# Patient Record
Sex: MALE | Race: WHITE | NOT HISPANIC OR LATINO | URBAN - METROPOLITAN AREA
[De-identification: names, ages, dates, MRNs, and addresses within clinical notes are randomized per-mention and may not be internally consistent; named-entity substitution may affect disease eponyms.]

---

## 2017-07-10 ENCOUNTER — INPATIENT (INPATIENT)
Facility: HOSPITAL | Age: 62
LOS: 2 days | Discharge: HOME | DRG: 603 | End: 2017-07-13
Attending: INTERNAL MEDICINE | Admitting: INTERNAL MEDICINE
Payer: COMMERCIAL

## 2017-07-10 VITALS
SYSTOLIC BLOOD PRESSURE: 133 MMHG | DIASTOLIC BLOOD PRESSURE: 83 MMHG | RESPIRATION RATE: 18 BRPM | OXYGEN SATURATION: 97 % | HEART RATE: 77 BPM | TEMPERATURE: 99 F

## 2017-07-10 DIAGNOSIS — Z98.890 OTHER SPECIFIED POSTPROCEDURAL STATES: Chronic | ICD-10-CM

## 2017-07-10 DIAGNOSIS — I89.1 LYMPHANGITIS: ICD-10-CM

## 2017-07-10 PROBLEM — Z00.00 ENCOUNTER FOR PREVENTIVE HEALTH EXAMINATION: Status: ACTIVE | Noted: 2017-07-10

## 2017-07-10 LAB
ALBUMIN SERPL ELPH-MCNC: 4 G/DL — SIGNIFICANT CHANGE UP (ref 3.3–5)
ALP SERPL-CCNC: 82 U/L — SIGNIFICANT CHANGE UP (ref 40–120)
ALT FLD-CCNC: 16 U/L RC — SIGNIFICANT CHANGE UP (ref 10–45)
ANION GAP SERPL CALC-SCNC: 13 MMOL/L — SIGNIFICANT CHANGE UP (ref 5–17)
APTT BLD: 35.3 SEC — SIGNIFICANT CHANGE UP (ref 27.5–37.4)
AST SERPL-CCNC: 20 U/L — SIGNIFICANT CHANGE UP (ref 10–40)
BILIRUB SERPL-MCNC: 1.1 MG/DL — SIGNIFICANT CHANGE UP (ref 0.2–1.2)
BUN SERPL-MCNC: 14 MG/DL — SIGNIFICANT CHANGE UP (ref 7–23)
CALCIUM SERPL-MCNC: 9.2 MG/DL — SIGNIFICANT CHANGE UP (ref 8.4–10.5)
CHLORIDE SERPL-SCNC: 104 MMOL/L — SIGNIFICANT CHANGE UP (ref 96–108)
CO2 SERPL-SCNC: 24 MMOL/L — SIGNIFICANT CHANGE UP (ref 22–31)
CREAT SERPL-MCNC: 0.98 MG/DL — SIGNIFICANT CHANGE UP (ref 0.5–1.3)
GLUCOSE SERPL-MCNC: 103 MG/DL — HIGH (ref 70–99)
HCT VFR BLD CALC: 44.8 % — SIGNIFICANT CHANGE UP (ref 39–50)
HGB BLD-MCNC: 14.6 G/DL — SIGNIFICANT CHANGE UP (ref 13–17)
INR BLD: 1.59 RATIO — HIGH (ref 0.88–1.16)
MCHC RBC-ENTMCNC: 29.9 PG — SIGNIFICANT CHANGE UP (ref 27–34)
MCHC RBC-ENTMCNC: 32.6 GM/DL — SIGNIFICANT CHANGE UP (ref 32–36)
MCV RBC AUTO: 91.7 FL — SIGNIFICANT CHANGE UP (ref 80–100)
PLATELET # BLD AUTO: 147 K/UL — LOW (ref 150–400)
POTASSIUM SERPL-MCNC: 4 MMOL/L — SIGNIFICANT CHANGE UP (ref 3.5–5.3)
POTASSIUM SERPL-SCNC: 4 MMOL/L — SIGNIFICANT CHANGE UP (ref 3.5–5.3)
PROT SERPL-MCNC: 7.3 G/DL — SIGNIFICANT CHANGE UP (ref 6–8.3)
PROTHROM AB SERPL-ACNC: 17.5 SEC — HIGH (ref 9.8–12.7)
RBC # BLD: 4.89 M/UL — SIGNIFICANT CHANGE UP (ref 4.2–5.8)
RBC # FLD: 13.6 % — SIGNIFICANT CHANGE UP (ref 10.3–14.5)
SODIUM SERPL-SCNC: 141 MMOL/L — SIGNIFICANT CHANGE UP (ref 135–145)
WBC # BLD: 9.9 K/UL — SIGNIFICANT CHANGE UP (ref 3.8–10.5)
WBC # FLD AUTO: 9.9 K/UL — SIGNIFICANT CHANGE UP (ref 3.8–10.5)

## 2017-07-10 PROCEDURE — 93971 EXTREMITY STUDY: CPT | Mod: 26

## 2017-07-10 PROCEDURE — 99222 1ST HOSP IP/OBS MODERATE 55: CPT

## 2017-07-10 PROCEDURE — 99285 EMERGENCY DEPT VISIT HI MDM: CPT

## 2017-07-10 RX ORDER — RIVAROXABAN 15 MG-20MG
20 KIT ORAL EVERY 24 HOURS
Qty: 0 | Refills: 0 | Status: DISCONTINUED | OUTPATIENT
Start: 2017-07-10 | End: 2017-07-13

## 2017-07-10 RX ORDER — CEFAZOLIN SODIUM 1 G
2000 VIAL (EA) INJECTION ONCE
Qty: 0 | Refills: 0 | Status: COMPLETED | OUTPATIENT
Start: 2017-07-10 | End: 2017-07-10

## 2017-07-10 RX ORDER — RIVAROXABAN 15 MG-20MG
0 KIT ORAL
Qty: 0 | Refills: 0 | COMMUNITY

## 2017-07-10 RX ORDER — ATORVASTATIN CALCIUM 80 MG/1
10 TABLET, FILM COATED ORAL AT BEDTIME
Qty: 0 | Refills: 0 | Status: DISCONTINUED | OUTPATIENT
Start: 2017-07-10 | End: 2017-07-11

## 2017-07-10 RX ORDER — ATORVASTATIN CALCIUM 80 MG/1
0 TABLET, FILM COATED ORAL
Qty: 0 | Refills: 0 | COMMUNITY

## 2017-07-10 RX ORDER — UBIDECARENONE 100 MG
0 CAPSULE ORAL
Qty: 0 | Refills: 0 | COMMUNITY

## 2017-07-10 RX ORDER — VANCOMYCIN HCL 1 G
1000 VIAL (EA) INTRAVENOUS ONCE
Qty: 0 | Refills: 0 | Status: COMPLETED | OUTPATIENT
Start: 2017-07-10 | End: 2017-07-10

## 2017-07-10 RX ORDER — CEFAZOLIN SODIUM 1 G
2000 VIAL (EA) INJECTION EVERY 8 HOURS
Qty: 0 | Refills: 0 | Status: DISCONTINUED | OUTPATIENT
Start: 2017-07-10 | End: 2017-07-13

## 2017-07-10 RX ORDER — METOPROLOL TARTRATE 50 MG
0 TABLET ORAL
Qty: 0 | Refills: 0 | COMMUNITY

## 2017-07-10 RX ORDER — METOPROLOL TARTRATE 50 MG
25 TABLET ORAL
Qty: 0 | Refills: 0 | Status: DISCONTINUED | OUTPATIENT
Start: 2017-07-10 | End: 2017-07-13

## 2017-07-10 RX ORDER — CEFAZOLIN SODIUM 1 G
VIAL (EA) INJECTION
Qty: 0 | Refills: 0 | Status: DISCONTINUED | OUTPATIENT
Start: 2017-07-10 | End: 2017-07-13

## 2017-07-10 RX ADMIN — Medication 100 MILLIGRAM(S): at 13:17

## 2017-07-10 RX ADMIN — Medication 100 MILLIGRAM(S): at 21:30

## 2017-07-10 RX ADMIN — Medication 250 MILLIGRAM(S): at 11:47

## 2017-07-10 RX ADMIN — RIVAROXABAN 20 MILLIGRAM(S): KIT at 19:26

## 2017-07-10 NOTE — ED PROVIDER NOTE - MEDICAL DECISION MAKING DETAILS
LLE swelling and erythema after long flight c small amount of L foot trauma; on xarelto for a flutter.  Doubt deep venous thrombosis but will obtain LLE duplex.  Likely lymphangitis/cellulitis.  No proximal swelling or erythema/swalling.  Vanco.  Gentle fluids.  Labs.  Admit.  --BMM

## 2017-07-10 NOTE — ED ADULT NURSE NOTE - OBJECTIVE STATEMENT
1158 Pt with left lower leg redness started today sent in for antibiotics.  No fever chill Pt just returned form [place] form a cruise Pt has 2 inch cut on the bottom of foot.  Pt was walking around bare foot while on Vacation.  IVL placed and bloods sent as ordered and antibiotics hung Pt tolerating Mpuleorn

## 2017-07-10 NOTE — ED PROVIDER NOTE - PHYSICAL EXAMINATION
GEN: Patient well-appearing, well-groomed, resting comfortable, non-toxic, and in NAD.   HEENT: Symmetric Facies, PERRLA, no JVD.   CV: Rate irregularly irregular. No murmurs, gallops, or rubs.   RESP: Lungs CTABL w/ no rhonchi, rales, or wheezing.   ABD: Obese abdomen. Soft, non-tender, non-distended. + Bowel sounds throughout.   EXT: B/L lower extremity swelling, L>R w/ 2+ pitting edema B/L. LLE w/ area of erythema and shiny appearance, 22 cm X 15 cm extending to upper anterior leg and lower posterior leg. Area warm to the touch w/ tenderness to palpation. No calf tenderness. Homans sign - B/L. Palpable DP pulse on R, unable to palpate on L.   Neuro: Grossly intact, AOX3 with normal speech, CN II-XII intact.

## 2017-07-10 NOTE — H&P ADULT - NSHPLABSRESULTS_GEN_ALL_CORE
14.6   9.9   )-----------( 147      ( 10 Jul 2017 11:51 )             44.8       07-10    141  |  104  |  14  ----------------------------<  103<H>  4.0   |  24  |  0.98    Ca    9.2      10 Jul 2017 11:51    TPro  7.3  /  Alb  4.0  /  TBili  1.1  /  DBili  x   /  AST  20  /  ALT  16  /  AlkPhos  82  07-10                  PT/INR - ( 10 Jul 2017 11:51 )   PT: 17.5 sec;   INR: 1.59 ratio         PTT - ( 10 Jul 2017 11:51 )  PTT:35.3 sec    Lactate Trend            CAPILLARY BLOOD GLUCOSE

## 2017-07-10 NOTE — CONSULT NOTE ADULT - SUBJECTIVE AND OBJECTIVE BOX
Patient is a 62y old  Male who presents with a chief complaint of   HPI: 2 days of redness swelling and pain of lle after flying back form [place]  no fever chills  no allergies to ab  no ulcers hs of cellulitis yrs ago.      PAST MEDICAL & SURGICAL HISTORY:  Atrial fibrillation, unspecified type  Stroke  H/O left knee surgery      Social history:    FAMILY HISTORY:    REVIEW OF SYSTEMS  General:	Denies any malaise fatigue or chills. Fevers absent    Skin:No rash  	  Ophthalmologic:Denies any visual complaints,discharge redness or photophobia  	  ENMT:No nasal discharge,headache,sinus congestion or throat pain.No dental complaints    Respiratory and Thorax:No cough,sputum or chest pain.Denies shortness of breath  	  Cardiovascular:	No chest pain,palpitaions or dizziness    Gastrointestinal:	NO nausea,abdominal pain or diarrhea.    Genitourinary:	No dysuria,frequency. No flank pain    Musculoskeletal:	No joint swelling or pain.No weakness    Neurological:No confusion,diziness.No extremity weakness.No bladder or bowel incontinence	    Psychiatric:No delusions or hallucinations	    Hematology/Lymphatics:	No LN swelling.No gum bleeding     Endocrine:	No recent weight gain or loss.No abnormal heat/cold intolerance    Allergic/Immunologic:	No hives or rash   Allergies    No Known Allergies    Intolerances        Antimicrobials:          Vital Signs Last 24 Hrs  T(C): 37.3 (10 Jul 2017 10:52), Max: 37.3 (10 Jul 2017 10:52)  T(F): 99.2 (10 Jul 2017 10:52), Max: 99.2 (10 Jul 2017 10:52)  HR: 77 (10 Jul 2017 10:52) (77 - 77)  BP: 133/83 (10 Jul 2017 10:52) (133/83 - 133/83)  BP(mean): --  RR: 18 (10 Jul 2017 10:52) (18 - 18)  SpO2: 97% (10 Jul 2017 10:52) (97% - 97%)    PHYSICAL EXAM:Pleasant patient in no acute distress.      Constitutional:Comfortable.Awake and alert  obese     Eyes:PERRL EOMI.NO discharge or conjunctival injection    ENMT:No sinus tenderness.No thrush.No pharyngeal exudate or erythema.Fair dental hygiene    Neck:Supple,No LN,no JVD      Respiratory:Good air entry bilaterally,CTA    Cardiovascular:S1 S2 wnl, No murmurs,rub or gallops    Gastrointestinal:Soft BS(+) no tenderness no masses ,No rebound or guarding    Genitourinary:No CVA tendereness     Rectal:    Extremities:No cyanosis,clubbing or edema.    Vascular:peripheral pulses felt    Neurological:AAO X 3,No grossly focal deficits    Skin:No rash     Lymph Nodes:No palpable LNs    Musculoskeletal:No joint swelling or LOM    Psychiatric:Affect normal.                                14.6   9.9   )-----------( 147      ( 10 Jul 2017 11:51 )             44.8         07-10    141  |  104  |  14  ----------------------------<  103<H>  4.0   |  24  |  0.98    Ca    9.2      10 Jul 2017 11:51    TPro  7.3  /  Alb  4.0  /  TBili  1.1  /  DBili  x   /  AST  20  /  ALT  16  /  AlkPhos  82  07-10      RECENT CULTURES:      MICROBIOLOGY:          Radiology:      Assessment:        Recommendations and Plan:    Pager [phone]  After 5 pm/weekends or if no response :[phone]

## 2017-07-10 NOTE — ED PROVIDER NOTE - OBJECTIVE STATEMENT
62 year old M w/ PMH of Atrial fibrillation on Metorolol and Xarelto and CVA early 2017 presents w/ 2 day history of worsening LLE redness, swelling, and pain. Patient states 2 weeks ago he flew to [place] and spent the last 2 weeks fishing, flew back last night. Has chronic dryness of L sole and said he noticed linear cracking on L sole w/ pain but no redness. 2 days ago subsequently developed increasing swelling and redness of L anterior lower leg. Denies fever, chills, shortness of breath. Denies numbness or tingling of L lower leg. Patient states both legs are swollen at baseline but redness and pain are new and worsening. Has had episodes of cellulitis of L leg in the past, last episode ~8 years ago. A friend he was on the trip with gave him 2 doses of Amoxacillin 500 mg yesterday. Saw PCP Dr. Beto Colón yesterday who referred him to ED for evaluation for cellulitis and need for IV antibiotics. 62 year old M w/ PMH of Atrial fibrillation on Metorolol and Xarelto and CVA early 2017 w/ no residual deficits presents w/ 2 day history of worsening LLE redness, swelling, and pain. Patient states 2 weeks ago he flew to [place] and spent the last 2 weeks fishing on a [employer] and swimming in the ocean, flew back last night. Has chronic dryness of L sole and said he noticed linear cracking on L sole w/ pain but no redness while on vacation. 2 days ago subsequently developed increasing swelling and redness of L anterior lower leg. Denies fever, chills, shortness of breath. Denies numbness or tingling of L lower leg. Patient states both legs are swollen at baseline but redness and pain are new and worsening. Has had episodes of cellulitis of L leg in the past, last episode ~8 years ago. A friend he was on the trip with gave him 2 doses of Amoxacillin 500 mg yesterday. Saw PCP Dr. Beot Colón yesterday who referred him to ED for evaluation for cellulitis and need for IV antibiotics.

## 2017-07-10 NOTE — ED PROCEDURE NOTE - PROCEDURE ADDITIONAL DETAILS
POCUS: Emergency Department Focused Ultrasound performed at patient's bedside.  The complete report will be available in PACS.  no evidence of lle dvt. f/u us recommended in 5-7 days

## 2017-07-10 NOTE — ED PROVIDER NOTE - NS ED ROS FT
Constitutional: No fever, chills, night sweats, weight loss.   HEENT: No throat pain, ear pain, nasal pain, epistaxis.   CV: + B/L lower extremit edema. No chest pain, orthopnea, palpitations.   Resp: No SOB, cough, wheezing.   GI: No abdominal pain, nausea, vomiting, diarrhea, constipation.   : No dysuria, hematuria.   MSK: + L lower leg pain and swelling.   Skin: + LLE redness.  Neuro: No numbness, tingling, weakness, paralysis, paresis.

## 2017-07-10 NOTE — H&P ADULT - ASSESSMENT
pt w/ llext cellulitis  iv abs  id eval  check lext doppler  hx a fib  cva  cobt xarelto  cont b blockers / lipitor

## 2017-07-11 LAB
ANION GAP SERPL CALC-SCNC: 16 MMOL/L — SIGNIFICANT CHANGE UP (ref 5–17)
BUN SERPL-MCNC: 15 MG/DL — SIGNIFICANT CHANGE UP (ref 7–23)
CALCIUM SERPL-MCNC: 9.1 MG/DL — SIGNIFICANT CHANGE UP (ref 8.4–10.5)
CHLORIDE SERPL-SCNC: 104 MMOL/L — SIGNIFICANT CHANGE UP (ref 96–108)
CO2 SERPL-SCNC: 23 MMOL/L — SIGNIFICANT CHANGE UP (ref 22–31)
CREAT SERPL-MCNC: 0.9 MG/DL — SIGNIFICANT CHANGE UP (ref 0.5–1.3)
GLUCOSE SERPL-MCNC: 95 MG/DL — SIGNIFICANT CHANGE UP (ref 70–99)
POTASSIUM SERPL-MCNC: 4 MMOL/L — SIGNIFICANT CHANGE UP (ref 3.5–5.3)
POTASSIUM SERPL-SCNC: 4 MMOL/L — SIGNIFICANT CHANGE UP (ref 3.5–5.3)
SODIUM SERPL-SCNC: 143 MMOL/L — SIGNIFICANT CHANGE UP (ref 135–145)
TSH SERPL-MCNC: 0.79 UIU/ML — SIGNIFICANT CHANGE UP (ref 0.27–4.2)

## 2017-07-11 PROCEDURE — 99232 SBSQ HOSP IP/OBS MODERATE 35: CPT

## 2017-07-11 RX ORDER — BACITRACIN ZINC 500 UNIT/G
1 OINTMENT IN PACKET (EA) TOPICAL
Qty: 0 | Refills: 0 | Status: DISCONTINUED | OUTPATIENT
Start: 2017-07-11 | End: 2017-07-13

## 2017-07-11 RX ORDER — KETOCONAZOLE 20 MG/G
1 AEROSOL, FOAM TOPICAL
Qty: 0 | Refills: 0 | Status: DISCONTINUED | OUTPATIENT
Start: 2017-07-11 | End: 2017-07-11

## 2017-07-11 RX ORDER — ATORVASTATIN CALCIUM 80 MG/1
10 TABLET, FILM COATED ORAL DAILY
Qty: 0 | Refills: 0 | Status: DISCONTINUED | OUTPATIENT
Start: 2017-07-11 | End: 2017-07-13

## 2017-07-11 RX ADMIN — Medication 100 MILLIGRAM(S): at 12:36

## 2017-07-11 RX ADMIN — Medication 25 MILLIGRAM(S): at 06:06

## 2017-07-11 RX ADMIN — KETOCONAZOLE 1 APPLICATION(S): 20 AEROSOL, FOAM TOPICAL at 08:31

## 2017-07-11 RX ADMIN — Medication 100 MILLIGRAM(S): at 21:34

## 2017-07-11 RX ADMIN — Medication 1 APPLICATION(S): at 17:11

## 2017-07-11 RX ADMIN — Medication 100 MILLIGRAM(S): at 06:06

## 2017-07-11 RX ADMIN — Medication 1 APPLICATION(S): at 17:10

## 2017-07-11 RX ADMIN — RIVAROXABAN 20 MILLIGRAM(S): KIT at 17:10

## 2017-07-11 RX ADMIN — Medication 25 MILLIGRAM(S): at 17:10

## 2017-07-11 RX ADMIN — ATORVASTATIN CALCIUM 10 MILLIGRAM(S): 80 TABLET, FILM COATED ORAL at 08:25

## 2017-07-11 NOTE — DIETITIAN INITIAL EVALUATION ADULT. - OTHER INFO
Pt reports prior to trying to lose weight over the past 8 months. Pt was weighing ~380 lbs prior to losing weight, pt is weighed twice weekly at nutritionist's office and recently has been ~340 lbs, noted current admit weight 338.4 lbs (7/10). Pt states weight loss has been somewhat limited by inability to engage in much physical activity due to hip pain. Pt with no food allergies, takes Coq10 at home. No issues with N+V, last BM today. No difficulty with chewing/swallowing. Pt did not eat much yesterday and had no breakfast this morning, feeling hungry for lunch.

## 2017-07-11 NOTE — CONSULT NOTE ADULT - ASSESSMENT
63 yo M w/ LLE cellulitis, fissure L foot, and tinea pedis  -Pt seen and evaluated  -no surgical intervention  -rec daily dressing changes with bacitracin to fissure, clotrimazole to surrounding skin  -abx per ID  -seen with attending

## 2017-07-11 NOTE — DIETITIAN INITIAL EVALUATION ADULT. - NS AS NUTRI INTERV MEALS SNACK
1. Continue regular diet as ordered-reviewed healthy alternative menu selections with patient, 2. Review weight management education as able, 3. Monitor PO intake, diet tolerance, weight trends, labs, and skin integrity, 4. RD to remain available as needed

## 2017-07-11 NOTE — PROGRESS NOTE ADULT - ASSESSMENT
pt w/ streptoccocal cellulitis of leg  cont iv abs  antifungal crean  podiatry eval for foot care /w ound  oob  hx a fib/ cva  cont a/c  cont b blockers

## 2017-07-11 NOTE — PROGRESS NOTE ADULT - ASSESSMENT
streptococcal cellultis due to venous disease obesity and fungal infection  continue ancef  add antifungal cream

## 2017-07-11 NOTE — DIETITIAN INITIAL EVALUATION ADULT. - ENERGY NEEDS
Pt seen for consult for BMI >40. Pt with PMH including Afib and stroke now admitted with lower extremitiy swelling found to have cellulitis, on antibiotics.    Ht:5'10" , Wt:338.4 lbs, BMI:48.5 kg/m2, IBW:166 lbs +/- 10%, %IBW: 204%  3+ R ankle/knee edema, Skin free of pressure injury.

## 2017-07-11 NOTE — DIETITIAN INITIAL EVALUATION ADULT. - ORAL INTAKE PTA
good/Pt reports eating well with good appetite consuming 3 meals per day making active efforts to reduce weight. Diet recall: breakfast: coffee with 1/2 and 1/2, wafer bar, Lunch: salad with grilled chicken or meatballs, dinner: salad, veggies, occasionally pasta, protein

## 2017-07-11 NOTE — DIETITIAN INITIAL EVALUATION ADULT. - NS AS NUTRI INTERV ED CONTENT
Provided general healthy eating nutrition therapy, discussed portion sizes using my plate method-pt follows with nutritionist outpatient twice weekly, declined written materials

## 2017-07-11 NOTE — CONSULT NOTE ADULT - SUBJECTIVE AND OBJECTIVE BOX
Patient is a 62y old  Male who presents with a chief complaint of     HPI:  pt with llext swelling and redness   back from trip yesterday (10 Jul 2017 14:33)    States he has a history of a fissure to the left foot with recurrent episodes of cellulitis. Denies FCNV.      PAST MEDICAL & SURGICAL HISTORY:  Atrial fibrillation, unspecified type  Stroke  H/O left knee surgery      MEDICATIONS  (STANDING):  ceFAZolin   IVPB   IV Intermittent   ceFAZolin   IVPB 2000 milliGRAM(s) IV Intermittent every 8 hours  rivaroxaban 20 milliGRAM(s) Oral every 24 hours  metoprolol 25 milliGRAM(s) Oral two times a day  atorvastatin 10 milliGRAM(s) Oral daily  ketoconazole 2% Cream 1 Application(s) Topical two times a day  BACItracin   Ointment 1 Application(s) Topical two times a day    MEDICATIONS  (PRN):      Allergies    No Known Allergies    Intolerances        VITALS:    Vital Signs Last 24 Hrs  T(C): 36.7 (11 Jul 2017 06:03), Max: 37.3 (10 Jul 2017 10:52)  T(F): 98 (11 Jul 2017 06:03), Max: 99.2 (10 Jul 2017 10:52)  HR: 71 (11 Jul 2017 06:03) (59 - 77)  BP: 136/71 (11 Jul 2017 06:03) (123/77 - 137/74)  BP(mean): --  RR: 18 (11 Jul 2017 06:03) (15 - 18)  SpO2: 96% (11 Jul 2017 06:03) (95% - 98%)    LABS:                          14.6   9.9   )-----------( 147      ( 10 Jul 2017 11:51 )             44.8       07-11    143  |  104  |  15  ----------------------------<  95  4.0   |  23  |  0.90    Ca    9.1      11 Jul 2017 08:28    TPro  7.3  /  Alb  4.0  /  TBili  1.1  /  DBili  x   /  AST  20  /  ALT  16  /  AlkPhos  82  07-10      CAPILLARY BLOOD GLUCOSE          PT/INR - ( 10 Jul 2017 11:51 )   PT: 17.5 sec;   INR: 1.59 ratio         PTT - ( 10 Jul 2017 11:51 )  PTT:35.3 sec    LOWER EXTREMITY PHYSICAL EXAM:    Vascular: nonpalpable pulses 2/2 edema, 2+ pitting edema up to lani knees, erythema to left leg from ankle to knee, previously marked with marking pen  Neuro: Epicritic sensation intact  Musculoskeletal/Ortho: no deformities  Skin: fissure to plantar L midfoot, no drainage, does not probe past dermis, surrounding flaky skin consistent with chronic tinea pedis. no surroudning erythema.      RADIOLOGY & ADDITIONAL STUDIES:

## 2017-07-11 NOTE — DIETITIAN INITIAL EVALUATION ADULT. - ADHERENCE
Pt follows up with a nutritionist twice weekly and has been making active efforts to reduce weight, pt sends pictures of all foods consumed to nutritionist , pt trying to consume more veggies and lean meats.

## 2017-07-11 NOTE — PROGRESS NOTE ADULT - SUBJECTIVE AND OBJECTIVE BOX
infectious diseases progress note:    Patient is a 62y old  Male who presents with a chief complaint of       Lymphangitis no change          ROS:  CONSTITUTIONAL:  Negative fever or chills, feels well, good appetite  EYES:  Negative  blurry vision or double vision  CARDIOVASCULAR:  Negative for chest pain or palpitations  RESPIRATORY:  Negative for cough, wheezing, or SOB   GASTROINTESTINAL:  Negative for nausea, vomiting, diarrhea, constipation, or abdominal pain  GENITOURINARY:  Negative frequency, urgency or dysuria  NEUROLOGIC:  No headache, confusion, dizziness, lightheadedness    Allergies    No Known Allergies    Intolerances        ANTIBIOTICS/RELEVANT:  antimicrobials  ceFAZolin   IVPB   IV Intermittent   ceFAZolin   IVPB 2000 milliGRAM(s) IV Intermittent every 8 hours    immunologic:    OTHER:  rivaroxaban 20 milliGRAM(s) Oral every 24 hours  metoprolol 25 milliGRAM(s) Oral two times a day  atorvastatin 10 milliGRAM(s) Oral daily      Objective:  Vital Signs Last 24 Hrs  T(C): 36.7 (11 Jul 2017 06:03), Max: 37.3 (10 Jul 2017 10:52)  T(F): 98 (11 Jul 2017 06:03), Max: 99.2 (10 Jul 2017 10:52)  HR: 71 (11 Jul 2017 06:03) (59 - 77)  BP: 136/71 (11 Jul 2017 06:03) (123/77 - 137/74)  BP(mean): --  RR: 18 (11 Jul 2017 06:03) (15 - 18)  SpO2: 96% (11 Jul 2017 06:03) (95% - 98%)    PHYSICAL EXAM:  Constitutional:Well-developed, well nourished--no acute distress  Eyes:ALLISON, EOMI  Extremities:no change in reddnes no ulcer cut on bottom of foot  due to dermatophyte infection       LABS:                        14.6   9.9   )-----------( 147      ( 10 Jul 2017 11:51 )             44.8     07-10    141  |  104  |  14  ----------------------------<  103<H>  4.0   |  24  |  0.98    Ca    9.2      10 Jul 2017 11:51    TPro  7.3  /  Alb  4.0  /  TBili  1.1  /  DBili  x   /  AST  20  /  ALT  16  /  AlkPhos  82  07-10    PT/INR - ( 10 Jul 2017 11:51 )   PT: 17.5 sec;   INR: 1.59 ratio         PTT - ( 10 Jul 2017 11:51 )  PTT:35.3 sec        MICROBIOLOGY:    RECENT CULTURES:        RESPIRATORY CULTURES:              RADIOLOGY & ADDITIONAL STUDIES:        Pager [phone]  After 5 pm/weekends or if no response :[phone]

## 2017-07-11 NOTE — PROGRESS NOTE ADULT - SUBJECTIVE AND OBJECTIVE BOX
CHIEF COMPLAINT:Patient is a 62y old  Male who presents with a chief complaint of   	llext redness swelling approximately the same        PAST MEDICAL & SURGICAL HISTORY:  Atrial fibrillation, unspecified type  Stroke  H/O left knee surgery          REVIEW OF SYSTEMS:  CONSTITUTIONAL: No fever, weight loss, or fatigue  EYES: No eye pain, visual disturbances, or discharge  NECK: No pain or stiffness  RESPIRATORY: No cough, wheezing, chills or hemoptysis; No Shortness of Breath  CARDIOVASCULAR: No chest pain, palpitations, passing out, dizziness, or leg swelling  GASTROINTESTINAL: No abdominal or epigastric pain. No nausea, vomiting, or hematemesis; No diarrhea or constipation. No melena or hematochezia.  GENITOURINARY: No dysuria, frequency, hematuria, or incontinence  NEUROLOGICAL: No headaches, memory loss, loss of strength, numbness, or tremors  SKIN: llext redness  LYMPH Nodes: No enlarged glands  ENDOCRINE: No heat or cold intolerance; No hair loss  MUSCULOSKELETAL: llext swelling redness/    Medications:  MEDICATIONS  (STANDING):  ceFAZolin   IVPB   IV Intermittent   ceFAZolin   IVPB 2000 milliGRAM(s) IV Intermittent every 8 hours  rivaroxaban 20 milliGRAM(s) Oral every 24 hours  metoprolol 25 milliGRAM(s) Oral two times a day  atorvastatin 10 milliGRAM(s) Oral daily  ketoconazole 2% Cream 1 Application(s) Topical two times a day  BACItracin   Ointment 1 Application(s) Topical two times a day    MEDICATIONS  (PRN):    	    PHYSICAL EXAM:  T(C): 36.7 (07-11-17 @ 06:03), Max: 37.3 (07-10-17 @ 10:52)  HR: 71 (07-11-17 @ 06:03) (59 - 77)  BP: 136/71 (07-11-17 @ 06:03) (123/77 - 137/74)  RR: 18 (07-11-17 @ 06:03) (15 - 18)  SpO2: 96% (07-11-17 @ 06:03) (95% - 98%)  Wt(kg): --  I&O's Summary    10 Jul 2017 07:01  -  11 Jul 2017 07:00  --------------------------------------------------------  IN: 250 mL / OUT: 0 mL / NET: 250 mL        Appearance: Normal	  HEENT:   Normal oral mucosa, PERRL, EOMI	  Lymphatic: No lymphadenopathy  Cardiovascular: Normal S1 S2, No JVD, No murmurs, No edema  Respiratory: Lungs clear to auscultation	  Psychiatry: A & O x 3, Mood & affect appropriate  Gastrointestinal:  Soft, Non-tender, + BS	  Skin: No rashes, No ecchymoses, No cyanosis	  Neurologic: Non-focal  Extremities: Normal range of motion, No clubbing, cyanosis or edema  Vascular: Peripheral pulses palpable 2+ bilaterally    TELEMETRY: 	    ECG:  	  RADIOLOGY:  OTHER: 	  	  LABS:	 	    CARDIAC MARKERS:                                14.6   9.9   )-----------( 147      ( 10 Jul 2017 11:51 )             44.8     07-11    143  |  104  |  15  ----------------------------<  95  4.0   |  23  |  0.90    Ca    9.1      11 Jul 2017 08:28    TPro  7.3  /  Alb  4.0  /  TBili  1.1  /  DBili  x   /  AST  20  /  ALT  16  /  AlkPhos  82  07-10    proBNP:   Lipid Profile:   HgA1c:   TSH:

## 2017-07-11 NOTE — CHART NOTE - NSCHARTNOTEFT_GEN_A_CORE
Upon Nutritional Assessment by the Registered Dietitian your patient was determined to meet criteria / has evidence of the following diagnosis/diagnoses:          [ ]  Mild Protein Calorie Malnutrition        [ ]  Moderate Protein Calorie Malnutrition        [ ] Severe Protein Calorie Malnutrition        [ ] Unspecified Protein Calorie Malnutrition        [ ] Underweight / BMI <19        [ x] Morbid Obesity / BMI > 40      Findings as based on:  [ x] Comprehensive nutrition assessment   [ ] Nutrition Focused Physical Exam  [ ] Other: measurement of height and weight, BMI 48.5      Nutrition Plan/Recommendations:  Provide weight management education, pt reports following with nutritionist twice weekly as outpatient.        PROVIDER Section:     By signing this assessment you are acknowledging and agree with the diagnosis/diagnoses assigned by the Registered Dietitian    Comments:

## 2017-07-12 ENCOUNTER — TRANSCRIPTION ENCOUNTER (OUTPATIENT)
Age: 62
End: 2017-07-12

## 2017-07-12 LAB
ANION GAP SERPL CALC-SCNC: 15 MMOL/L — SIGNIFICANT CHANGE UP (ref 5–17)
BUN SERPL-MCNC: 14 MG/DL — SIGNIFICANT CHANGE UP (ref 7–23)
CALCIUM SERPL-MCNC: 8.8 MG/DL — SIGNIFICANT CHANGE UP (ref 8.4–10.5)
CHLORIDE SERPL-SCNC: 103 MMOL/L — SIGNIFICANT CHANGE UP (ref 96–108)
CO2 SERPL-SCNC: 22 MMOL/L — SIGNIFICANT CHANGE UP (ref 22–31)
CREAT SERPL-MCNC: 0.84 MG/DL — SIGNIFICANT CHANGE UP (ref 0.5–1.3)
GLUCOSE SERPL-MCNC: 102 MG/DL — HIGH (ref 70–99)
HCT VFR BLD CALC: 41.8 % — SIGNIFICANT CHANGE UP (ref 39–50)
HGB BLD-MCNC: 14.2 G/DL — SIGNIFICANT CHANGE UP (ref 13–17)
MCHC RBC-ENTMCNC: 29.9 PG — SIGNIFICANT CHANGE UP (ref 27–34)
MCHC RBC-ENTMCNC: 34 GM/DL — SIGNIFICANT CHANGE UP (ref 32–36)
MCV RBC AUTO: 88 FL — SIGNIFICANT CHANGE UP (ref 80–100)
PLATELET # BLD AUTO: 152 K/UL — SIGNIFICANT CHANGE UP (ref 150–400)
POTASSIUM SERPL-MCNC: 4 MMOL/L — SIGNIFICANT CHANGE UP (ref 3.5–5.3)
POTASSIUM SERPL-SCNC: 4 MMOL/L — SIGNIFICANT CHANGE UP (ref 3.5–5.3)
RBC # BLD: 4.75 M/UL — SIGNIFICANT CHANGE UP (ref 4.2–5.8)
RBC # FLD: 14.3 % — SIGNIFICANT CHANGE UP (ref 10.3–14.5)
SODIUM SERPL-SCNC: 140 MMOL/L — SIGNIFICANT CHANGE UP (ref 135–145)
WBC # BLD: 6.85 K/UL — SIGNIFICANT CHANGE UP (ref 3.8–10.5)
WBC # FLD AUTO: 6.85 K/UL — SIGNIFICANT CHANGE UP (ref 3.8–10.5)

## 2017-07-12 PROCEDURE — 99232 SBSQ HOSP IP/OBS MODERATE 35: CPT

## 2017-07-12 RX ORDER — METOPROLOL TARTRATE 50 MG
1 TABLET ORAL
Qty: 0 | Refills: 0 | COMMUNITY

## 2017-07-12 RX ORDER — METOPROLOL TARTRATE 50 MG
1 TABLET ORAL
Qty: 0 | Refills: 0 | DISCHARGE
Start: 2017-07-12

## 2017-07-12 RX ORDER — BACITRACIN ZINC 500 UNIT/G
1 OINTMENT IN PACKET (EA) TOPICAL
Qty: 1 | Refills: 0
Start: 2017-07-12 | End: 2017-07-17

## 2017-07-12 RX ORDER — ATORVASTATIN CALCIUM 80 MG/1
1 TABLET, FILM COATED ORAL
Qty: 0 | Refills: 0 | COMMUNITY

## 2017-07-12 RX ORDER — RIVAROXABAN 15 MG-20MG
1 KIT ORAL
Qty: 0 | Refills: 0 | COMMUNITY

## 2017-07-12 RX ORDER — CEPHALEXIN 500 MG
1 CAPSULE ORAL
Qty: 28 | Refills: 0
Start: 2017-07-12 | End: 2017-07-19

## 2017-07-12 RX ORDER — RIVAROXABAN 15 MG-20MG
1 KIT ORAL
Qty: 0 | Refills: 0 | DISCHARGE
Start: 2017-07-12

## 2017-07-12 RX ORDER — ATORVASTATIN CALCIUM 80 MG/1
1 TABLET, FILM COATED ORAL
Qty: 0 | Refills: 0 | DISCHARGE
Start: 2017-07-12

## 2017-07-12 RX ADMIN — Medication 25 MILLIGRAM(S): at 17:38

## 2017-07-12 RX ADMIN — ATORVASTATIN CALCIUM 10 MILLIGRAM(S): 80 TABLET, FILM COATED ORAL at 06:07

## 2017-07-12 RX ADMIN — Medication 1 APPLICATION(S): at 17:37

## 2017-07-12 RX ADMIN — Medication 100 MILLIGRAM(S): at 21:27

## 2017-07-12 RX ADMIN — Medication 100 MILLIGRAM(S): at 06:08

## 2017-07-12 RX ADMIN — Medication 1 APPLICATION(S): at 06:07

## 2017-07-12 RX ADMIN — Medication 1 APPLICATION(S): at 06:09

## 2017-07-12 RX ADMIN — Medication 25 MILLIGRAM(S): at 06:09

## 2017-07-12 RX ADMIN — RIVAROXABAN 20 MILLIGRAM(S): KIT at 17:38

## 2017-07-12 RX ADMIN — Medication 100 MILLIGRAM(S): at 12:52

## 2017-07-12 RX ADMIN — Medication 1 APPLICATION(S): at 17:38

## 2017-07-12 NOTE — PROGRESS NOTE ADULT - ASSESSMENT
pt w/ streptoccocal cellulitis of leg  cont iv abs  antifungal crean  podiatry f/u  for foot care /w ound  oob  hx a fib/ cva  cont a/c  cont b blockers  id f/u

## 2017-07-12 NOTE — PROGRESS NOTE ADULT - SUBJECTIVE AND OBJECTIVE BOX
infectious diseases progress note:    Patient is a 62y old  Male who presents with a chief complaint of       Lymphangitis  better .          ROS:  CONSTITUTIONAL:  Negative fever or chills, feels well, good appetite  EYES:  Negative  blurry vision or double vision  CARDIOVASCULAR:  Negative for chest pain or palpitations  RESPIRATORY:  Negative for cough, wheezing, or SOB   GASTROINTESTINAL:  Negative for nausea, vomiting, diarrhea, constipation, or abdominal pain  GENITOURINARY:  Negative frequency, urgency or dysuria  NEUROLOGIC:  No headache, confusion, dizziness, lightheadedness    Allergies    No Known Allergies    Intolerances        ANTIBIOTICS/RELEVANT:  antimicrobials  ceFAZolin   IVPB   IV Intermittent   ceFAZolin   IVPB 2000 milliGRAM(s) IV Intermittent every 8 hours    immunologic:    OTHER:  rivaroxaban 20 milliGRAM(s) Oral every 24 hours  metoprolol 25 milliGRAM(s) Oral two times a day  atorvastatin 10 milliGRAM(s) Oral daily  BACItracin   Ointment 1 Application(s) Topical two times a day  clotrimazole 1% Cream 1 Application(s) Topical two times a day      Objective:  Vital Signs Last 24 Hrs  T(C): 36.4 (12 Jul 2017 04:00), Max: 37.2 (11 Jul 2017 18:30)  T(F): 97.5 (12 Jul 2017 04:00), Max: 99 (11 Jul 2017 18:30)  HR: 64 (12 Jul 2017 05:38) (59 - 72)  BP: 111/74 (12 Jul 2017 05:38) (102/66 - 136/76)  BP(mean): --  RR: 17 (12 Jul 2017 04:00) (17 - 18)  SpO2: 97% (12 Jul 2017 04:00) (94% - 97%)    PHYSICAL EXAM:  Constitutional:Well-developed, well nourished--no acute distress  Eyes:ALLISON, EOMI  Ear/Nose/Throat: no oral lesion, no sinus tenderness on percussion	  Neck:no JVD, no lymphadenopathy, supple  Respiratory: CTA lani  Cardiovascular: S1S2 RRR, no murmurs  Gastrointestinal:soft, (+) BS, no HSM  Extremities: no increased redness  minimal tenderness        LABS:                        14.6   9.9   )-----------( 147      ( 10 Jul 2017 11:51 )             44.8     07-11    143  |  104  |  15  ----------------------------<  95  4.0   |  23  |  0.90    Ca    9.1      11 Jul 2017 08:28    TPro  7.3  /  Alb  4.0  /  TBili  1.1  /  DBili  x   /  AST  20  /  ALT  16  /  AlkPhos  82  07-10    PT/INR - ( 10 Jul 2017 11:51 )   PT: 17.5 sec;   INR: 1.59 ratio         PTT - ( 10 Jul 2017 11:51 )  PTT:35.3 sec        MICROBIOLOGY:    RECENT CULTURES:        RESPIRATORY CULTURES:              RADIOLOGY & ADDITIONAL STUDIES:        Pager [phone]  After 5 pm/weekends or if no response :[phone]

## 2017-07-12 NOTE — DISCHARGE NOTE ADULT - PATIENT PORTAL LINK FT
“You can access the [facility] Patient Portal, offered by [facility], by registering with the following website: [email or web address]”

## 2017-07-12 NOTE — DISCHARGE NOTE ADULT - CARE PLAN
Principal Discharge DX:	Cellulitis  Goal:	continue keflex  Instructions for follow-up, activity and diet:	Take all of your antibiotics as ordered.  Call your Health Care Provider within two days of arriving home to make a follow up appointment within one week.  If the affected cellulitic area increases in redness, warmth, pain or swelling call your Health Care Provider.  If you develop fever, chills, and/or malaise, call your Health Care Provider.  Secondary Diagnosis:	Atrial fibrillation, unspecified type  Goal:	continue xarelto  Instructions for follow-up, activity and diet:	Atrial fibrillation is the most common heart rhythm problem.  The condition puts you at risk for has stroke and heart attack  It helps if you control your blood pressure, not drink more than 1-2 alcohol drinks per day, cut down on caffeine, getting treatment for over active thyroid gland, and get regular exercise  Call your doctor if you feel your heart racing or beating unusually, chest tightness or pain, lightheaded, faint, shortness of breath especially with exercise  It is important to take your heart medication as prescribed  You may be on anticoagulation which is very important to take as directed - you may need blood work to monitor drug levels

## 2017-07-12 NOTE — PROGRESS NOTE ADULT - ASSESSMENT
Despite  redness he is non toxic  feels well  and the redness will take weeks to resolve.  can be discharged in am to complete a total of 10 days of po keflex  500 qid ( including the time given in hospital).  No formal follow up needed with ID unless this relapses.

## 2017-07-12 NOTE — DISCHARGE NOTE ADULT - PLAN OF CARE
continue keflex Take all of your antibiotics as ordered.  Call your Health Care Provider within two days of arriving home to make a follow up appointment within one week.  If the affected cellulitic area increases in redness, warmth, pain or swelling call your Health Care Provider.  If you develop fever, chills, and/or malaise, call your Health Care Provider. Atrial fibrillation is the most common heart rhythm problem.  The condition puts you at risk for has stroke and heart attack  It helps if you control your blood pressure, not drink more than 1-2 alcohol drinks per day, cut down on caffeine, getting treatment for over active thyroid gland, and get regular exercise  Call your doctor if you feel your heart racing or beating unusually, chest tightness or pain, lightheaded, faint, shortness of breath especially with exercise  It is important to take your heart medication as prescribed  You may be on anticoagulation which is very important to take as directed - you may need blood work to monitor drug levels continue xarelto

## 2017-07-12 NOTE — DISCHARGE NOTE ADULT - CARE PROVIDER_API CALL
Beto Colón), Cardiovascular Disease; Critical Care Medicine; Internal Medicine; Nuclear Cardiology  [address] [place] [address]  Phone: [phone]  Fax: [phone]    Ashkan Delong), Infectious Disease; Internal Medicine  [address] [place] [address]  Phone: [phone]  Fax: [phone]

## 2017-07-12 NOTE — DISCHARGE NOTE ADULT - HOSPITAL COURSE
xxx 61 y/o male with pmh significant for afib--on xarelto, cva, left knee surg,  came from a trip yesterday, p/w left leg swelling/redness.    Dx-- LLE cellulitis  pt seen by id   abs as per id   improvement and changed to po w/ outpt f.u

## 2017-07-12 NOTE — DISCHARGE NOTE ADULT - ADDITIONAL INSTRUCTIONS
Follow up with infectious disease if cellulitis relapses.  Follow up with your PCP within 1 week of discharge.

## 2017-07-12 NOTE — PROGRESS NOTE ADULT - SUBJECTIVE AND OBJECTIVE BOX
CHIEF COMPLAINT:Patient is a 62y old  Male who presents with a chief complaint of   	llext redness and swelling        PAST MEDICAL & SURGICAL HISTORY:  Atrial fibrillation, unspecified type  Stroke  H/O left knee surgery          REVIEW OF SYSTEMS:  CONSTITUTIONAL: No fever, weight loss, or fatigue  EYES: No eye pain, visual disturbances, or discharge  NECK: No pain or stiffness  RESPIRATORY: No cough, wheezing, chills or hemoptysis; No Shortness of Breath  CARDIOVASCULAR: No chest pain, palpitations, passing out, dizziness, or leg swelling  GASTROINTESTINAL: No abdominal or epigastric pain. No nausea, vomiting, or hematemesis; No diarrhea or constipation. No melena or hematochezia.  GENITOURINARY: No dysuria, frequency, hematuria, or incontinence  NEUROLOGICAL: No headaches, memory loss, loss of strength, numbness, or tremors  SKIN: No itching, burning, rashes, or lesions   LYMPH Nodes: No enlarged glands  ENDOCRINE: No heat or cold intolerance; No hair loss  MUSCULOSKELETAL:llext swelling redness    Medications:  MEDICATIONS  (STANDING):  ceFAZolin   IVPB   IV Intermittent   ceFAZolin   IVPB 2000 milliGRAM(s) IV Intermittent every 8 hours  rivaroxaban 20 milliGRAM(s) Oral every 24 hours  metoprolol 25 milliGRAM(s) Oral two times a day  atorvastatin 10 milliGRAM(s) Oral daily  BACItracin   Ointment 1 Application(s) Topical two times a day  clotrimazole 1% Cream 1 Application(s) Topical two times a day    MEDICATIONS  (PRN):    	    PHYSICAL EXAM:  T(C): 36.4 (07-12-17 @ 04:00), Max: 37.2 (07-11-17 @ 18:30)  HR: 64 (07-12-17 @ 05:38) (59 - 72)  BP: 111/74 (07-12-17 @ 05:38) (102/66 - 136/76)  RR: 17 (07-12-17 @ 04:00) (17 - 18)  SpO2: 97% (07-12-17 @ 04:00) (94% - 97%)  Wt(kg): --  I&O's Summary      Appearance: Normal	  HEENT:   Normal oral mucosa, PERRL, EOMI	  Lymphatic: No lymphadenopathy  Cardiovascular: Normal S1 S2, No JVD, No murmurs, No edema  Respiratory: Lungs clear to auscultation	  Psychiatry: A & O x 3, Mood & affect appropriate  Gastrointestinal:  Soft, Non-tender, + BS	  Skin: No rashes, No ecchymoses, No cyanosis	  Neurologic: Non-focal from  Extremities: llext slightly better . less swelling  less redness  Vascular: Peripheral pulses palpable 2+ bilaterally    TELEMETRY: 	    ECG:  	  RADIOLOGY:  OTHER: 	  	  LABS:	 	    CARDIAC MARKERS:                                14.6   9.9   )-----------( 147      ( 10 Jul 2017 11:51 )             44.8     07-11    143  |  104  |  15  ----------------------------<  95  4.0   |  23  |  0.90    Ca    9.1      11 Jul 2017 08:28    TPro  7.3  /  Alb  4.0  /  TBili  1.1  /  DBili  x   /  AST  20  /  ALT  16  /  AlkPhos  82  07-10    proBNP:   Lipid Profile:   HgA1c:   TSH:

## 2017-07-12 NOTE — DISCHARGE NOTE ADULT - MEDICATION SUMMARY - MEDICATIONS TO STOP TAKING
I will STOP taking the medications listed below when I get home from the hospital:    CoQ 10 600mg  -- 1 cap(s) by mouth once a day    Aleve 220 mg oral tablet  -- 1 tab(s) by mouth , As Needed

## 2017-07-12 NOTE — DISCHARGE NOTE ADULT - MEDICATION SUMMARY - MEDICATIONS TO TAKE
I will START or STAY ON the medications listed below when I get home from the hospital:    rivaroxaban 20 mg oral tablet  -- 1 tab(s) by mouth every 24 hours  -- Indication: For Atrial fibrillation, unspecified type    atorvastatin 10 mg oral tablet  -- 1 tab(s) by mouth once a day  -- Indication: For Hyperlipidemia    metoprolol tartrate 25 mg oral tablet  -- 1 tab(s) by mouth 2 times a day  -- Indication: For Hypertension    Keflex 500 mg oral capsule  -- 1 cap(s) by mouth 4 times a day  -- Finish all this medication unless otherwise directed by prescriber.    -- Indication: For cellulitis    bacitracin 500 units/g topical ointment  -- 1 application on skin 2 times a day  -- Indication: For rash    clotrimazole 1% topical cream  -- 1 application on skin 2 times a day  -- Indication: For fungal

## 2017-07-13 VITALS
SYSTOLIC BLOOD PRESSURE: 105 MMHG | TEMPERATURE: 98 F | OXYGEN SATURATION: 98 % | RESPIRATION RATE: 18 BRPM | DIASTOLIC BLOOD PRESSURE: 78 MMHG | HEART RATE: 71 BPM

## 2017-07-13 LAB
ANION GAP SERPL CALC-SCNC: 16 MMOL/L — SIGNIFICANT CHANGE UP (ref 5–17)
BUN SERPL-MCNC: 14 MG/DL — SIGNIFICANT CHANGE UP (ref 7–23)
CALCIUM SERPL-MCNC: 9.2 MG/DL — SIGNIFICANT CHANGE UP (ref 8.4–10.5)
CHLORIDE SERPL-SCNC: 101 MMOL/L — SIGNIFICANT CHANGE UP (ref 96–108)
CO2 SERPL-SCNC: 23 MMOL/L — SIGNIFICANT CHANGE UP (ref 22–31)
CREAT SERPL-MCNC: 0.8 MG/DL — SIGNIFICANT CHANGE UP (ref 0.5–1.3)
GLUCOSE SERPL-MCNC: 101 MG/DL — HIGH (ref 70–99)
HCT VFR BLD CALC: 42.8 % — SIGNIFICANT CHANGE UP (ref 39–50)
HGB BLD-MCNC: 14.6 G/DL — SIGNIFICANT CHANGE UP (ref 13–17)
MCHC RBC-ENTMCNC: 29.7 PG — SIGNIFICANT CHANGE UP (ref 27–34)
MCHC RBC-ENTMCNC: 34.1 GM/DL — SIGNIFICANT CHANGE UP (ref 32–36)
MCV RBC AUTO: 87.2 FL — SIGNIFICANT CHANGE UP (ref 80–100)
PLATELET # BLD AUTO: 192 K/UL — SIGNIFICANT CHANGE UP (ref 150–400)
POTASSIUM SERPL-MCNC: 4.1 MMOL/L — SIGNIFICANT CHANGE UP (ref 3.5–5.3)
POTASSIUM SERPL-SCNC: 4.1 MMOL/L — SIGNIFICANT CHANGE UP (ref 3.5–5.3)
RBC # BLD: 4.91 M/UL — SIGNIFICANT CHANGE UP (ref 4.2–5.8)
RBC # FLD: 14.4 % — SIGNIFICANT CHANGE UP (ref 10.3–14.5)
SODIUM SERPL-SCNC: 140 MMOL/L — SIGNIFICANT CHANGE UP (ref 135–145)
WBC # BLD: 6.48 K/UL — SIGNIFICANT CHANGE UP (ref 3.8–10.5)
WBC # FLD AUTO: 6.48 K/UL — SIGNIFICANT CHANGE UP (ref 3.8–10.5)

## 2017-07-13 PROCEDURE — 99285 EMERGENCY DEPT VISIT HI MDM: CPT | Mod: 25

## 2017-07-13 PROCEDURE — 87040 BLOOD CULTURE FOR BACTERIA: CPT

## 2017-07-13 PROCEDURE — 85610 PROTHROMBIN TIME: CPT

## 2017-07-13 PROCEDURE — 85730 THROMBOPLASTIN TIME PARTIAL: CPT

## 2017-07-13 PROCEDURE — 96374 THER/PROPH/DIAG INJ IV PUSH: CPT

## 2017-07-13 PROCEDURE — 84443 ASSAY THYROID STIM HORMONE: CPT

## 2017-07-13 PROCEDURE — 80053 COMPREHEN METABOLIC PANEL: CPT

## 2017-07-13 PROCEDURE — 85027 COMPLETE CBC AUTOMATED: CPT

## 2017-07-13 PROCEDURE — 80048 BASIC METABOLIC PNL TOTAL CA: CPT

## 2017-07-13 PROCEDURE — 93971 EXTREMITY STUDY: CPT

## 2017-07-13 RX ADMIN — Medication 100 MILLIGRAM(S): at 12:02

## 2017-07-13 RX ADMIN — Medication 1 APPLICATION(S): at 05:41

## 2017-07-13 RX ADMIN — ATORVASTATIN CALCIUM 10 MILLIGRAM(S): 80 TABLET, FILM COATED ORAL at 05:41

## 2017-07-13 RX ADMIN — Medication 100 MILLIGRAM(S): at 05:40

## 2017-07-13 RX ADMIN — Medication 25 MILLIGRAM(S): at 06:53

## 2017-07-13 NOTE — PROGRESS NOTE ADULT - ASSESSMENT
pt w/ streptoccocal cellulitis of leg  change to po abs  antifungal crean  podiatry f/u  for foot care /w ound  hx a fib/ cva  cont a/c  cont b blockers  id f/u noted  d/c home today   follow up w/ dr trista umana

## 2017-07-15 LAB
CULTURE RESULTS: SIGNIFICANT CHANGE UP
CULTURE RESULTS: SIGNIFICANT CHANGE UP
SPECIMEN SOURCE: SIGNIFICANT CHANGE UP
SPECIMEN SOURCE: SIGNIFICANT CHANGE UP

## 2019-11-07 ENCOUNTER — EMERGENCY (EMERGENCY)
Facility: HOSPITAL | Age: 64
LOS: 1 days | Discharge: HOME | End: 2019-11-07
Attending: EMERGENCY MEDICINE
Payer: COMMERCIAL

## 2019-11-07 VITALS
DIASTOLIC BLOOD PRESSURE: 83 MMHG | TEMPERATURE: 98 F | SYSTOLIC BLOOD PRESSURE: 127 MMHG | RESPIRATION RATE: 18 BRPM | HEART RATE: 63 BPM | OXYGEN SATURATION: 97 %

## 2019-11-07 VITALS
RESPIRATION RATE: 18 BRPM | WEIGHT: 315 LBS | HEIGHT: 70 IN | TEMPERATURE: 98 F | HEART RATE: 70 BPM | DIASTOLIC BLOOD PRESSURE: 84 MMHG | OXYGEN SATURATION: 98 % | SYSTOLIC BLOOD PRESSURE: 130 MMHG

## 2019-11-07 DIAGNOSIS — Z98.890 OTHER SPECIFIED POSTPROCEDURAL STATES: Chronic | ICD-10-CM

## 2019-11-07 PROBLEM — I48.91 UNSPECIFIED ATRIAL FIBRILLATION: Chronic | Status: ACTIVE | Noted: 2017-07-10

## 2019-11-07 LAB
ALBUMIN SERPL ELPH-MCNC: 4.3 G/DL — SIGNIFICANT CHANGE UP (ref 3.3–5)
ALP SERPL-CCNC: 110 U/L — SIGNIFICANT CHANGE UP (ref 40–120)
ALT FLD-CCNC: 18 U/L — SIGNIFICANT CHANGE UP (ref 10–45)
ANION GAP SERPL CALC-SCNC: 13 MMOL/L — SIGNIFICANT CHANGE UP (ref 5–17)
APPEARANCE UR: ABNORMAL
AST SERPL-CCNC: 17 U/L — SIGNIFICANT CHANGE UP (ref 10–40)
BACTERIA # UR AUTO: 0 — SIGNIFICANT CHANGE UP
BASE EXCESS BLDV CALC-SCNC: 0.1 MMOL/L — SIGNIFICANT CHANGE UP (ref -2–2)
BASE EXCESS BLDV CALC-SCNC: 0.7 MMOL/L — SIGNIFICANT CHANGE UP (ref -2–2)
BASOPHILS # BLD AUTO: 0.03 K/UL — SIGNIFICANT CHANGE UP (ref 0–0.2)
BASOPHILS NFR BLD AUTO: 0.2 % — SIGNIFICANT CHANGE UP (ref 0–2)
BILIRUB SERPL-MCNC: 0.6 MG/DL — SIGNIFICANT CHANGE UP (ref 0.2–1.2)
BILIRUB UR-MCNC: NEGATIVE — SIGNIFICANT CHANGE UP
BUN SERPL-MCNC: 17 MG/DL — SIGNIFICANT CHANGE UP (ref 7–23)
CA-I SERPL-SCNC: 1.13 MMOL/L — SIGNIFICANT CHANGE UP (ref 1.12–1.3)
CA-I SERPL-SCNC: 1.16 MMOL/L — SIGNIFICANT CHANGE UP (ref 1.12–1.3)
CALCIUM SERPL-MCNC: 9.9 MG/DL — SIGNIFICANT CHANGE UP (ref 8.4–10.5)
CHLORIDE BLDV-SCNC: 108 MMOL/L — SIGNIFICANT CHANGE UP (ref 96–108)
CHLORIDE BLDV-SCNC: 110 MMOL/L — HIGH (ref 96–108)
CHLORIDE SERPL-SCNC: 100 MMOL/L — SIGNIFICANT CHANGE UP (ref 96–108)
CO2 BLDV-SCNC: 28 MMOL/L — SIGNIFICANT CHANGE UP (ref 22–30)
CO2 BLDV-SCNC: 28 MMOL/L — SIGNIFICANT CHANGE UP (ref 22–30)
CO2 SERPL-SCNC: 24 MMOL/L — SIGNIFICANT CHANGE UP (ref 22–31)
COLOR SPEC: SIGNIFICANT CHANGE UP
CREAT SERPL-MCNC: 1.19 MG/DL — SIGNIFICANT CHANGE UP (ref 0.5–1.3)
DIFF PNL FLD: ABNORMAL
EOSINOPHIL # BLD AUTO: 0.02 K/UL — SIGNIFICANT CHANGE UP (ref 0–0.5)
EOSINOPHIL NFR BLD AUTO: 0.2 % — SIGNIFICANT CHANGE UP (ref 0–6)
EPI CELLS # UR: 0 /HPF — SIGNIFICANT CHANGE UP
GAS PNL BLDV: 134 MMOL/L — LOW (ref 135–145)
GAS PNL BLDV: 135 MMOL/L — SIGNIFICANT CHANGE UP (ref 135–145)
GAS PNL BLDV: SIGNIFICANT CHANGE UP
GLUCOSE BLDV-MCNC: 106 MG/DL — HIGH (ref 70–99)
GLUCOSE BLDV-MCNC: 95 MG/DL — SIGNIFICANT CHANGE UP (ref 70–99)
GLUCOSE SERPL-MCNC: 113 MG/DL — HIGH (ref 70–99)
GLUCOSE UR QL: NEGATIVE — SIGNIFICANT CHANGE UP
HCO3 BLDV-SCNC: 26 MMOL/L — SIGNIFICANT CHANGE UP (ref 21–29)
HCO3 BLDV-SCNC: 27 MMOL/L — SIGNIFICANT CHANGE UP (ref 21–29)
HCT VFR BLD CALC: 43.6 % — SIGNIFICANT CHANGE UP (ref 39–50)
HCT VFR BLDA CALC: 44 % — SIGNIFICANT CHANGE UP (ref 39–50)
HCT VFR BLDA CALC: 47 % — SIGNIFICANT CHANGE UP (ref 39–50)
HGB BLD CALC-MCNC: 14.3 G/DL — SIGNIFICANT CHANGE UP (ref 13–17)
HGB BLD CALC-MCNC: 15.2 G/DL — SIGNIFICANT CHANGE UP (ref 13–17)
HGB BLD-MCNC: 14.9 G/DL — SIGNIFICANT CHANGE UP (ref 13–17)
HYALINE CASTS # UR AUTO: 0 /LPF — SIGNIFICANT CHANGE UP (ref 0–2)
IMM GRANULOCYTES NFR BLD AUTO: 0.4 % — SIGNIFICANT CHANGE UP (ref 0–1.5)
KETONES UR-MCNC: NEGATIVE — SIGNIFICANT CHANGE UP
LACTATE BLDV-MCNC: 2.8 MMOL/L — HIGH (ref 0.7–2)
LACTATE BLDV-MCNC: 2.8 MMOL/L — HIGH (ref 0.7–2)
LEUKOCYTE ESTERASE UR-ACNC: NEGATIVE — SIGNIFICANT CHANGE UP
LIDOCAIN IGE QN: 28 U/L — SIGNIFICANT CHANGE UP (ref 7–60)
LYMPHOCYTES # BLD AUTO: 1.48 K/UL — SIGNIFICANT CHANGE UP (ref 1–3.3)
LYMPHOCYTES # BLD AUTO: 11.9 % — LOW (ref 13–44)
MCHC RBC-ENTMCNC: 30.2 PG — SIGNIFICANT CHANGE UP (ref 27–34)
MCHC RBC-ENTMCNC: 34.2 GM/DL — SIGNIFICANT CHANGE UP (ref 32–36)
MCV RBC AUTO: 88.4 FL — SIGNIFICANT CHANGE UP (ref 80–100)
MONOCYTES # BLD AUTO: 0.71 K/UL — SIGNIFICANT CHANGE UP (ref 0–0.9)
MONOCYTES NFR BLD AUTO: 5.7 % — SIGNIFICANT CHANGE UP (ref 2–14)
NEUTROPHILS # BLD AUTO: 10.1 K/UL — HIGH (ref 1.8–7.4)
NEUTROPHILS NFR BLD AUTO: 81.6 % — HIGH (ref 43–77)
NITRITE UR-MCNC: NEGATIVE — SIGNIFICANT CHANGE UP
NRBC # BLD: 0 /100 WBCS — SIGNIFICANT CHANGE UP (ref 0–0)
PCO2 BLDV: 49 MMHG — SIGNIFICANT CHANGE UP (ref 35–50)
PCO2 BLDV: 51 MMHG — HIGH (ref 35–50)
PH BLDV: 7.34 — LOW (ref 7.35–7.45)
PH BLDV: 7.34 — LOW (ref 7.35–7.45)
PH UR: 6 — SIGNIFICANT CHANGE UP (ref 5–8)
PLATELET # BLD AUTO: 182 K/UL — SIGNIFICANT CHANGE UP (ref 150–400)
PO2 BLDV: 21 MMHG — LOW (ref 25–45)
PO2 BLDV: 31 MMHG — SIGNIFICANT CHANGE UP (ref 25–45)
POTASSIUM BLDV-SCNC: 3.7 MMOL/L — SIGNIFICANT CHANGE UP (ref 3.5–5.3)
POTASSIUM BLDV-SCNC: 4.1 MMOL/L — SIGNIFICANT CHANGE UP (ref 3.5–5.3)
POTASSIUM SERPL-MCNC: 4.2 MMOL/L — SIGNIFICANT CHANGE UP (ref 3.5–5.3)
POTASSIUM SERPL-SCNC: 4.2 MMOL/L — SIGNIFICANT CHANGE UP (ref 3.5–5.3)
PROT SERPL-MCNC: 7.3 G/DL — SIGNIFICANT CHANGE UP (ref 6–8.3)
PROT UR-MCNC: SIGNIFICANT CHANGE UP
RBC # BLD: 4.93 M/UL — SIGNIFICANT CHANGE UP (ref 4.2–5.8)
RBC # FLD: 13.4 % — SIGNIFICANT CHANGE UP (ref 10.3–14.5)
RBC CASTS # UR COMP ASSIST: 20 /HPF — HIGH (ref 0–4)
SAO2 % BLDV: 28 % — LOW (ref 67–88)
SAO2 % BLDV: 53 % — LOW (ref 67–88)
SODIUM SERPL-SCNC: 137 MMOL/L — SIGNIFICANT CHANGE UP (ref 135–145)
SP GR SPEC: 1.01 — SIGNIFICANT CHANGE UP (ref 1.01–1.02)
UROBILINOGEN FLD QL: NEGATIVE — SIGNIFICANT CHANGE UP
WBC # BLD: 12.39 K/UL — HIGH (ref 3.8–10.5)
WBC # FLD AUTO: 12.39 K/UL — HIGH (ref 3.8–10.5)
WBC UR QL: 1 /HPF — SIGNIFICANT CHANGE UP (ref 0–5)

## 2019-11-07 PROCEDURE — 99284 EMERGENCY DEPT VISIT MOD MDM: CPT | Mod: 25

## 2019-11-07 PROCEDURE — 82947 ASSAY GLUCOSE BLOOD QUANT: CPT

## 2019-11-07 PROCEDURE — 96374 THER/PROPH/DIAG INJ IV PUSH: CPT

## 2019-11-07 PROCEDURE — 82330 ASSAY OF CALCIUM: CPT

## 2019-11-07 PROCEDURE — 87086 URINE CULTURE/COLONY COUNT: CPT

## 2019-11-07 PROCEDURE — 84295 ASSAY OF SERUM SODIUM: CPT

## 2019-11-07 PROCEDURE — 81001 URINALYSIS AUTO W/SCOPE: CPT

## 2019-11-07 PROCEDURE — 83690 ASSAY OF LIPASE: CPT

## 2019-11-07 PROCEDURE — 82435 ASSAY OF BLOOD CHLORIDE: CPT

## 2019-11-07 PROCEDURE — 85027 COMPLETE CBC AUTOMATED: CPT

## 2019-11-07 PROCEDURE — 84132 ASSAY OF SERUM POTASSIUM: CPT

## 2019-11-07 PROCEDURE — 83605 ASSAY OF LACTIC ACID: CPT

## 2019-11-07 PROCEDURE — 74176 CT ABD & PELVIS W/O CONTRAST: CPT | Mod: 26

## 2019-11-07 PROCEDURE — 82803 BLOOD GASES ANY COMBINATION: CPT

## 2019-11-07 PROCEDURE — 99284 EMERGENCY DEPT VISIT MOD MDM: CPT

## 2019-11-07 PROCEDURE — 74176 CT ABD & PELVIS W/O CONTRAST: CPT

## 2019-11-07 PROCEDURE — 85014 HEMATOCRIT: CPT

## 2019-11-07 PROCEDURE — 80053 COMPREHEN METABOLIC PANEL: CPT

## 2019-11-07 PROCEDURE — 96375 TX/PRO/DX INJ NEW DRUG ADDON: CPT

## 2019-11-07 PROCEDURE — 99232 SBSQ HOSP IP/OBS MODERATE 35: CPT

## 2019-11-07 RX ORDER — MORPHINE SULFATE 50 MG/1
4 CAPSULE, EXTENDED RELEASE ORAL ONCE
Refills: 0 | Status: DISCONTINUED | OUTPATIENT
Start: 2019-11-07 | End: 2019-11-07

## 2019-11-07 RX ORDER — OXYCODONE AND ACETAMINOPHEN 5; 325 MG/1; MG/1
1 TABLET ORAL
Qty: 2 | Refills: 0
Start: 2019-11-07 | End: 2019-11-07

## 2019-11-07 RX ORDER — SODIUM CHLORIDE 9 MG/ML
1000 INJECTION INTRAMUSCULAR; INTRAVENOUS; SUBCUTANEOUS ONCE
Refills: 0 | Status: COMPLETED | OUTPATIENT
Start: 2019-11-07 | End: 2019-11-07

## 2019-11-07 RX ORDER — KETOROLAC TROMETHAMINE 30 MG/ML
30 SYRINGE (ML) INJECTION ONCE
Refills: 0 | Status: DISCONTINUED | OUTPATIENT
Start: 2019-11-07 | End: 2019-11-07

## 2019-11-07 RX ORDER — TAMSULOSIN HYDROCHLORIDE 0.4 MG/1
1 CAPSULE ORAL
Qty: 2 | Refills: 0
Start: 2019-11-07 | End: 2019-11-08

## 2019-11-07 RX ADMIN — Medication 30 MILLIGRAM(S): at 15:28

## 2019-11-07 RX ADMIN — MORPHINE SULFATE 4 MILLIGRAM(S): 50 CAPSULE, EXTENDED RELEASE ORAL at 16:31

## 2019-11-07 RX ADMIN — MORPHINE SULFATE 4 MILLIGRAM(S): 50 CAPSULE, EXTENDED RELEASE ORAL at 15:28

## 2019-11-07 RX ADMIN — Medication 30 MILLIGRAM(S): at 16:31

## 2019-11-07 RX ADMIN — SODIUM CHLORIDE 1000 MILLILITER(S): 9 INJECTION INTRAMUSCULAR; INTRAVENOUS; SUBCUTANEOUS at 16:14

## 2019-11-07 RX ADMIN — SODIUM CHLORIDE 1000 MILLILITER(S): 9 INJECTION INTRAMUSCULAR; INTRAVENOUS; SUBCUTANEOUS at 15:28

## 2019-11-07 NOTE — ED PROVIDER NOTE - NSFOLLOWUPINSTRUCTIONS_ED_ALL_ED_FT
You have been seen and evaluated in the hospital for flank pain which was found to be due to a kidney stone. This diagnosis was made on CT. You were also seen and evaluated by urology who stated you were medically clear for discharge with strict follow up w/ Dr. Chapman. Please make sure to return to the ED for the following symptoms which include but are not limited to unrelenting abdominal pain, anay blood in urine, fever, chills, nausea, vomiting, or any change from baseline that is concerning.

## 2019-11-07 NOTE — CONSULT NOTE ADULT - ASSESSMENT
64 year old male with PMHx of a.fib on xarelto and HLD presenting to ED with left renal colic secondary to a 3mm uvj stone     -Fluid hydration  -PO analgesia and antiemetics prn  -Flomax 0.4mg   -Strain urine  -Call the office or go to the emergency room if you experience fever greater than 101, chills, pain not relieved by oral medication, inability to tolerate food or liquids.  -Follow up with Dr. Chapman tomorrow at 11: 45am   [phone]     D/w Dr. Chapman

## 2019-11-07 NOTE — CONSULT NOTE ADULT - SUBJECTIVE AND OBJECTIVE BOX
HPI: 64 year old male with PMHx of a.fib on xarelto and HLD presents to the ED complaining of left sided flank plain since 9am this morning. Pain is described as sharp and now radiating to LLQ. Admits to associated nausea, and urinary frequency. He denies fever, chills, vomiting, urgency, dysuria or gross hematuria.    PAST MEDICAL & SURGICAL HISTORY:  Atrial fibrillation, unspecified type  Stroke  H/O left knee surgery    FAMILY HISTORY:  No pertinent family history in first degree relatives    SOCIAL HISTORY:   Tobacco hx:  MEDICATIONS  (STANDING):    MEDICATIONS  (PRN):    Allergies    No Known Allergies    Intolerances    REVIEW OF SYSTEMS: Pertinent positives and negatives as stated in HPI, otherwise negative    Vital signs  T(C): 36.9 (19 @ 18:43), Max: 37 (19 @ 15:56)  HR: 63 (19 @ 18:43)  BP: 127/83 (19 @ 18:43)  SpO2: 97% (19 @ 18:43)  Wt(kg): --    Output      Physical Exam  Gen: NAD  Pulm: No respiratory distress, no subcostal retractions  CV: RRR, no JVD  Abd: Soft, NT, ND  : Uncircumcised/Circumcised, no lesions.  No discharge or blood at urethral meatus.  Testes descended bilaterally.  Testes and epididymis nontender bilaterally.  Cremasteric reflex present bilaterally.  MSK: No edema present    LABS:     @ 15:33    WBC 12.39 / Hct 43.6  / SCr 1.19         137  |  100  |  17  ----------------------------<  113<H>  4.2   |  24  |  1.19    Ca    9.9      2019 15:33    TPro  7.3  /  Alb  4.3  /  TBili  0.6  /  DBili  x   /  AST  17  /  ALT  18  /  AlkPhos  110        Urinalysis Basic - ( 2019 15:37 )    Color: Light Yellow / Appearance: Slightly Turbid / S.012 / pH: x  Gluc: x / Ketone: Negative  / Bili: Negative / Urobili: Negative   Blood: x / Protein: Trace / Nitrite: Negative   Leuk Esterase: Negative / RBC: 20 /hpf / WBC 1 /HPF   Sq Epi: x / Non Sq Epi: 0 /hpf / Bacteria: 0.0        Urine Cx:   Blood Cx:    Radiology: HPI: 64 year old male with PMHx of a.fib on xarelto and HLD presents to the ED complaining of left sided flank plain since 9am this morning. Pain is described as sharp and now radiating to LLQ. Admits to associated nausea, and urinary frequency. He denies fever, chills, vomiting, urgency, dysuria or gross hematuria.    Currently pain controlled in ED with morphine.     PAST MEDICAL & SURGICAL HISTORY:  Atrial fibrillation, unspecified type  Stroke  H/O left knee surgery    FAMILY HISTORY:  No pertinent family history in first degree relatives    SOCIAL HISTORY:   Tobacco hx:  MEDICATIONS  (STANDING):    MEDICATIONS  (PRN):    Allergies    No Known Allergies    Intolerances    REVIEW OF SYSTEMS: Pertinent positives and negatives as stated in HPI, otherwise negative    Vital signs  T(C): 36.9 (19 @ 18:43), Max: 37 (19 @ 15:56)  HR: 63 (19 @ 18:43)  BP: 127/83 (19 @ 18:43)  SpO2: 97% (19 @ 18:43)    Physical Exam  Gen: NAD  Pulm: No respiratory distress, no subcostal retractions  CV: RRR, no JVD  Abd: Soft, NT, ND  Back: No cvat     LABS:     @ 15:33    WBC 12.39 / Hct 43.6  / SCr 1.19         137  |  100  |  17  ----------------------------<  113<H>  4.2   |  24  |  1.19    Ca    9.9      2019 15:33    TPro  7.3  /  Alb  4.3  /  TBili  0.6  /  DBili  x   /  AST  17  /  ALT  18  /  AlkPhos  110        Urinalysis Basic - ( 2019 15:37 )    Color: Light Yellow / Appearance: Slightly Turbid / S.012 / pH: x  Gluc: x / Ketone: Negative  / Bili: Negative / Urobili: Negative   Blood: x / Protein: Trace / Nitrite: Negative   Leuk Esterase: Negative / RBC: 20 /hpf / WBC 1 /HPF   Sq Epi: x / Non Sq Epi: 0 /hpf / Bacteria: 0.0    Urine Cx: sent      Radiology:  < from: CT Abdomen and Pelvis No Cont (19 @ 17:22) >  IMPRESSION:     3 mm obstructing calculus at the left ureterovesicular junction with mild   left hydronephrosis.    < end of copied text >

## 2019-11-07 NOTE — ED PROVIDER NOTE - PATIENT PORTAL LINK FT
You can access the FollowMyHealth Patient Portal offered by [facility] by registering at the following website: [email or web address]. By joining [employer]’s FollowMyHealth portal, you will also be able to view your health information using other applications (apps) compatible with our system.

## 2019-11-07 NOTE — ED ADULT NURSE NOTE - NSIMPLEMENTINTERV_GEN_ALL_ED
Implemented All Universal Safety Interventions:  [place] to call system. Call bell, personal items and telephone within reach. Instruct patient to call for assistance. Room bathroom lighting operational. Non-slip footwear when patient is off stretcher. Physically safe environment: no spills, clutter or unnecessary equipment. Stretcher in lowest position, wheels locked, appropriate side rails in place.

## 2019-11-07 NOTE — ED PROVIDER NOTE - OBJECTIVE STATEMENT
64 year old male with past medical history of afib and hld presenting to the ED complaining of left sided flank plain. The patient stated that the pain started this morning around 9am and has been constant all day. He describes the pain as a sharp 7/10 pain localized to his left flank without radiation. He admits to some increased urinary frequency today and yesterday, and the pain is slightly relieved when he urinates. He was seen today by his urologist who referred him to the ED to get a workup for a stone. He denies fever, vomiting, abdominal pain, history of stones. 64 year old male  morbidly obese with past medical history of afib and hld on blockers and Xarelto.    presenting to the ED complaining of left sided flank plain. The patient stated that the pain started this morning around 9am and has been constant all day. He describes the pain as a sharp 7/10 pain localized to his left flank without radiation. He admits to some increased urinary frequency today and yesterday, and the pain is slightly relieved when he urinates. He was seen today by his urologist who referred him to the ED to get a workup for a stone. He denies fever, vomiting, abdominal pain, history of stones.   dr Chapman [phone]

## 2019-11-07 NOTE — ED ADULT NURSE NOTE - OBJECTIVE STATEMENT
pt with left flank pain onset this morning with urinary urgency pt afib obese hx ambulatory to er denies vomiting

## 2019-11-07 NOTE — ED PROVIDER NOTE - CLINICAL SUMMARY MEDICAL DECISION MAKING FREE TEXT BOX
63 year old morbidly obese male with sudden onset of left flank pain, urinary frequency and urgency since this morning. No nausea, vomiting, blood in urine. Most likely kidney stone, will obtain blood work, ct stone hunt, pain medication, and re evaluation.ZR

## 2019-11-07 NOTE — ED PROVIDER NOTE - PROGRESS NOTE DETAILS
Hansel PGY1- patient pain controlled. Urology evaluated stable for d/c w/ flomax and med for pain as well as follow outpt uro Dr. Chapman

## 2019-11-08 LAB
CULTURE RESULTS: NO GROWTH — SIGNIFICANT CHANGE UP
SPECIMEN SOURCE: SIGNIFICANT CHANGE UP

## 2020-10-14 NOTE — ED ADULT NURSE NOTE - CAS DISCH CONDITION
Anesthesia Pre Eval Note    Anesthesia ROS/Med Hx    Overall Review:  EKG was reviewed     Anesthetic Complication History:  Patient does not have a history of anesthetic complications      Pulmonary Review:  Patient does not have a pulmonary history      Neuro/Psych Review:  Positive for seizures   Positive for psychiatric history    Cardiovascular Review:  Exercise tolerance: good (>4 METS)  Positive for hyperlipidemia    GI/HEPATIC/RENAL Review:  Patient does not have a GI/hepatic/renalhistory       End/Other Review:    Positive for anemia  Additional Results:  EKG:  No results found for this or any previous visit (from the past 4464 hour(s)).    ALLERGIES:  No Known Allergies       Lab Results       Component                Value               Date                       WBC                      4.5                 06/05/2020                 WBC                      5.3                 03/22/2019                 RBC                      4.29 (L)            06/05/2020                 RBC                      4.36 (L)            03/22/2019                 HCT                      39.1                06/05/2020                 HCT                      40.3                03/22/2019                 MCHC                     33.5                06/05/2020                 MCHC                     33.3                03/22/2019                 SODIUM                   142                 06/05/2020                 SODIUM                   141                 03/22/2019                 POTASSIUM                4.8                 06/05/2020                 POTASSIUM                4.1                 03/22/2019                 CHLORIDE                 104                 06/05/2020                 CHLORIDE                 102                 03/22/2019                 CO2                      27                  06/05/2020                 CO2                      28                  03/22/2019                 CO2                       32                  10/17/2011                 GLUCOSE                  100 (H)             06/05/2020                 GLUCOSE                  95                  03/22/2019                 BUN                      17                  06/05/2020                 BUN                      15                  03/22/2019                 CALCIUM                  9.0                 06/05/2020                 CALCIUM                  8.8                 03/22/2019                 CALCIUM                  9.3                 10/17/2011                 PLT                      236                 06/05/2020                 PLT                      228                 03/22/2019                 PLT                      224                 05/16/2013                 PLT                      209                 07/11/2012               Past Medical History:  No date: ANEMIA  No date: DEPRESSION  No date: HYPERLIPIDEMIA  No date: Loss of smell  No date: Peripheral vision loss  2/94: PNEUMONIA      Comment:  Necrotizing pneumonia of LLL  1991: SEIZURE DISORDER      Comment:  Post traumatic  1982: SKULL FRACTURE      Comment:  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 Hill  06/22/11: Esophagogastroduodenoscopy transoral flex w/bx single or   mult      Comment:  GERD,normal path,Dr. Hill  6/25/14: Finger tendon repair      Comment:  Left 4th finger  No date: Head surgery proc unlisted      Comment:  repair skull fracture  No date: Knee scope,diagnostic      Comment:  Right  age 18: Removal of  tonsils,11+ y/o  age 18: Repair ing hernia,5+y/o,reducibl      Comment:  Bilateral       Prior to Admission medications :  Medication sertraline (ZOLOFT) 100 MG tablet, Sig Take 1 tablet by mouth daily., Start Date 9/2/20, End Date , Taking? Yes, Authorizing Provider Cori Sue MD    Medication oxybutynin (DITROPAN-XL) 10 MG 24 hr tablet, Sig Take 1 tablet by mouth daily., Start Date 9/2/20, End Date , Taking? Yes, Authorizing Provider Zhou Alvarez MD    Medication phenyTOIN (DILANTIN) 100 MG ER capsule, Sig Take 3 capsules by mouth daily., Start Date 7/27/20, End Date , Taking? Yes, Authorizing Provider Cori Sue MD    Medication atorvastatin (LIPITOR) 20 MG tablet, Sig Take 1 tablet by mouth daily., Start Date 7/8/20, End Date , Taking? Yes, Authorizing Provider Cori Sue MD    Medication tadalafil (CIALIS) 20 MG tablet, Sig Take 1 tablet by mouth as needed for Erectile Dysfunction., Start Date 1/10/20, End Date , Taking? Yes, Authorizing Provider Zhou Alvarez MD    Medication Multiple Vitamin capsule, Sig Take 1 capsule by mouth daily., Start Date 5/10/12, End Date , Taking? Yes, Authorizing Provider Cori Sue MD    Medication Cholecalciferol (VITAMIN D) 2000 UNIT tablet, Sig Take 2,000 Units by mouth daily., Start Date 5/17/11, End Date , Taking? Yes, Authorizing Provider Cori Sue MD    Medication zoster vaccine recomb adjuvanted (Shingrix) 50 MCG/0.5ML injection, Sig Inject 0.5 mLs into the muscle. Repeat dose in 2 to 6 months (unless 1 dose already given), for a total of 2 doses., Start Date 8/26/20, End Date , Taking? , Authorizing Provider Titus Taylor MD            Relevant Problems   No relevant active problems       Physical Exam     Airway   Mallampati: III  TM Distance: <3 FB  Neck ROM: Full    Cardiovascular  Cardiovascular exam normal    Dental Exam  Dental exam normal    Pulmonary Exam  Pulmonary exam normal    Abdominal  Exam  Abdominal exam normal      Anesthesia Plan  No phone call attempted due to:  ASA Status: 2    Anesthesia Type: MAC    Maintenance: TIVA  Premedication: None      Post-op Pain Management: Per Surgeon      Checklist  Reviewed: Lab Results, Consultations, DNR Status, Past Med History, NPO Status, Patient Summary, Allergies, Medications, Problem list and Nursing Notes    Informed Consent  The proposed anesthetic plan, including its risks and benefits, have been discussed with the Patient  - along with the risks and benefits of alternatives.  Questions were encouraged and answered and the patient and/or representative understands and agrees to proceed.     Blood Products: Not Anticipated    Comments  Plan Comments: Discussed MAC. GA for backup discussed. Discussed risk of sore throat, dental and oropharyngeal trauma, allergic reaction, nerve injury, pulmonary complications, cardiac arrest and even death.     Stable

## 2021-11-29 ENCOUNTER — EMERGENCY (EMERGENCY)
Facility: HOSPITAL | Age: 66
LOS: 1 days | Discharge: HOME | End: 2021-11-29
Attending: EMERGENCY MEDICINE | Admitting: EMERGENCY MEDICINE
Payer: COMMERCIAL

## 2021-11-29 VITALS
DIASTOLIC BLOOD PRESSURE: 77 MMHG | HEART RATE: 56 BPM | SYSTOLIC BLOOD PRESSURE: 150 MMHG | OXYGEN SATURATION: 98 % | RESPIRATION RATE: 16 BRPM | TEMPERATURE: 97 F | HEIGHT: 70 IN | WEIGHT: 268.08 LBS

## 2021-11-29 DIAGNOSIS — Z98.890 OTHER SPECIFIED POSTPROCEDURAL STATES: Chronic | ICD-10-CM

## 2021-11-29 PROCEDURE — 99284 EMERGENCY DEPT VISIT MOD MDM: CPT | Mod: 25

## 2021-11-29 PROCEDURE — 70450 CT HEAD/BRAIN W/O DYE: CPT | Mod: MA

## 2021-11-29 PROCEDURE — 70450 CT HEAD/BRAIN W/O DYE: CPT | Mod: 26,MA

## 2021-11-29 PROCEDURE — 99284 EMERGENCY DEPT VISIT MOD MDM: CPT

## 2021-11-29 NOTE — ED PROVIDER NOTE - PATIENT PORTAL LINK FT
You can access the FollowMyHealth Patient Portal offered by [facility] by registering at the following website: [email or web address]. By joining [employer]’s FollowMyHealth portal, you will also be able to view your health information using other applications (apps) compatible with our system.

## 2021-11-29 NOTE — ED ADULT NURSE NOTE - OBJECTIVE STATEMENT
Pt arrived to ED s/p slip and fall on wet floor early this morning around 4am. pt state he hit right temporal head. CT complete.

## 2021-11-29 NOTE — ED PROVIDER NOTE - ATTENDING CONTRIBUTION TO CARE
Dr. Horton: I performed a face to face bedside interview with patient regarding history of present illness, review of symptoms and past medical history. I completed an independent physical exam.  I have discussed patient's plan of care with PA.   I agree with note as stated above, having amended the EMR as needed to reflect my findings.   This includes HISTORY OF PRESENT ILLNESS, HIV, PAST MEDICAL/SURGICAL/FAMILY/SOCIAL HISTORY, ALLERGIES AND HOME MEDICATIONS, REVIEW OF SYSTEMS, PHYSICAL EXAM, and any PROGRESS NOTES during the time I functioned as the attending physician for this patient.    see mdm

## 2021-11-29 NOTE — ED PROVIDER NOTE - CLINICAL SUMMARY MEDICAL DECISION MAKING FREE TEXT BOX
Dr. Horton: 66M h/o TIA, Afib on Xarelto p/w mechanical slip and fall last night, hit left side of head on door, no LOC. No nausea or vomiting, no other trauma, no headache, but sent by pmd for head CT. On exam pt is very well appearing, nad, irreg irreg, ctab, abdo soft/nt/nd, no signs of head trauma. Declined meds. Will get CT head.

## 2021-11-29 NOTE — ED PROVIDER NOTE - NSFOLLOWUPINSTRUCTIONS_ED_ALL_ED_FT
Follow up with your PMD within 24-48 hrs   Rest, Take Tylenol 650mg every 4-6 hours as needed for pain.  Take all of your other medications as previously described.   Return to the emergency department with ANY worsening or new concerning symptoms.     Fall Prevention    WHAT YOU NEED TO KNOW:    Fall prevention includes ways to make your home and other areas safer. It also includes ways you can move more carefully to prevent a fall. Health conditions that cause changes in your blood pressure, vision, or muscle strength and coordination may increase your risk for falls. Medicines may also increase your risk for falls if they make you dizzy, weak, or sleepy.    DISCHARGE INSTRUCTIONS:    Call 911 or have someone else call if:   •You have fallen and are unconscious.      •You have fallen and cannot move part of your body.      Contact your healthcare provider if:   •You have fallen and have pain or a headache.      •You have questions or concerns about your condition or care.      Fall prevention tips:   •Stand or sit up slowly. This may help you keep your balance and prevent falls.      •Use assistive devices as directed. Your healthcare provider may suggest that you use a cane or walker to help you keep your balance. You may need to have grab bars put in your bathroom near the toilet or in the shower.      •Wear shoes that fit well and have soles that . Wear shoes both inside and outside. Use slippers with good . Do not wear shoes with high heels.      •Wear a personal alarm. This is a device that allows you to call 911 if you fall and need help. Ask your healthcare provider for more information.      •Stay active. Exercise can help strengthen your muscles and improve your balance. Your healthcare provider may recommend water aerobics or walking. He or she may also recommend physical therapy to improve your coordination. Never start an exercise program without talking to your healthcare provider first.  Walking for Exercise           •Manage your medical conditions.  Keep all appointments with your healthcare providers. Visit your eye doctor as directed.      Home safety tips:     Fall Prevention for Adults     •Add items to prevent falls in the bathroom. Put nonslip strips on your bath or shower floor to prevent you from slipping. Use a bath mat if you do not have carpet in the bathroom. This will prevent you from falling when you step out of the bath or shower. Use a shower seat so you do not need to stand while you shower. Sit on the toilet or a chair in your bathroom to dry yourself and put on clothing. This will prevent you from losing your balance from drying or dressing yourself while you are standing.      •Keep paths clear. Remove books, shoes, and other objects from walkways and stairs. Place cords for telephones and lamps out of the way so that you do not need to walk over them. Tape them down if you cannot move them. Remove small rugs. If you cannot remove a rug, secure it with double-sided tape. This will prevent you from tripping.      •Install bright lights in your home. Use night lights to help light paths to the bathroom or kitchen. Always turn on the light before you start walking.      •Keep items you use often on shelves within reach. Do not use a step stool to help you reach an item.      •Paint or place reflective tape on the edges of your stairs. This will help you see the stairs better.      Follow up with your doctor as directed: Write down your questions so you remember to ask them during your visits.

## 2021-11-29 NOTE — ED PROVIDER NOTE - OBJECTIVE STATEMENT
65 y/o M h/o TIA, Afib (Xarelto) s/p slip and fall on water on the floor this morning 4am hitting the right temporal area of his head on the door knob. Denies LOC, headache, dizziness, visual changes, nausea, vomiting, trouble walking, trouble speaking, back/neck pain, weakness, numbness, tingling. Appears well.

## 2021-11-29 NOTE — ED PROVIDER NOTE - CHPI ED SYMPTOMS NEG
no blurred vision/no confusion/no dizziness/no loss of consciousness/no nausea/no syncope/no vomiting/no weakness/no change in level of consciousness

## 2021-12-03 NOTE — CHART NOTE - NSCHARTNOTEFT_GEN_A_CORE
SW called patient to discuss and assist with follow up care.  Patient presented to ED on 11/29/21 due to a fall.  Patient did not answer. there is no prior EKG available for comparison

## 2022-12-22 ENCOUNTER — OUTPATIENT (OUTPATIENT)
Dept: OUTPATIENT SERVICES | Facility: HOSPITAL | Age: 67
LOS: 1 days | End: 2022-12-22
Payer: SELF-PAY

## 2022-12-22 VITALS
WEIGHT: 296.52 LBS | HEART RATE: 52 BPM | SYSTOLIC BLOOD PRESSURE: 135 MMHG | OXYGEN SATURATION: 98 % | DIASTOLIC BLOOD PRESSURE: 75 MMHG | TEMPERATURE: 98 F | RESPIRATION RATE: 18 BRPM | HEIGHT: 68.5 IN

## 2022-12-22 DIAGNOSIS — I48.91 UNSPECIFIED ATRIAL FIBRILLATION: ICD-10-CM

## 2022-12-22 DIAGNOSIS — E78.5 HYPERLIPIDEMIA, UNSPECIFIED: ICD-10-CM

## 2022-12-22 DIAGNOSIS — Z41.1 ENCOUNTER FOR COSMETIC SURGERY: ICD-10-CM

## 2022-12-22 DIAGNOSIS — Z90.3 ACQUIRED ABSENCE OF STOMACH [PART OF]: Chronic | ICD-10-CM

## 2022-12-22 DIAGNOSIS — Z98.890 OTHER SPECIFIED POSTPROCEDURAL STATES: Chronic | ICD-10-CM

## 2022-12-22 DIAGNOSIS — Z96.641 PRESENCE OF RIGHT ARTIFICIAL HIP JOINT: Chronic | ICD-10-CM

## 2022-12-22 DIAGNOSIS — E66.9 OBESITY, UNSPECIFIED: ICD-10-CM

## 2022-12-22 DIAGNOSIS — Z01.818 ENCOUNTER FOR OTHER PREPROCEDURAL EXAMINATION: ICD-10-CM

## 2022-12-22 DIAGNOSIS — I10 ESSENTIAL (PRIMARY) HYPERTENSION: ICD-10-CM

## 2022-12-22 DIAGNOSIS — E88.1 LIPODYSTROPHY, NOT ELSEWHERE CLASSIFIED: ICD-10-CM

## 2022-12-22 LAB
ANION GAP SERPL CALC-SCNC: 12 MMOL/L — SIGNIFICANT CHANGE UP (ref 5–17)
BUN SERPL-MCNC: 18 MG/DL — SIGNIFICANT CHANGE UP (ref 7–23)
CALCIUM SERPL-MCNC: 9.2 MG/DL — SIGNIFICANT CHANGE UP (ref 8.4–10.5)
CHLORIDE SERPL-SCNC: 100 MMOL/L — SIGNIFICANT CHANGE UP (ref 96–108)
CO2 SERPL-SCNC: 25 MMOL/L — SIGNIFICANT CHANGE UP (ref 22–31)
CREAT SERPL-MCNC: 0.86 MG/DL — SIGNIFICANT CHANGE UP (ref 0.5–1.3)
EGFR: 94 ML/MIN/1.73M2 — SIGNIFICANT CHANGE UP
GLUCOSE SERPL-MCNC: 86 MG/DL — SIGNIFICANT CHANGE UP (ref 70–99)
HCT VFR BLD CALC: 46.3 % — SIGNIFICANT CHANGE UP (ref 39–50)
HGB BLD-MCNC: 15.2 G/DL — SIGNIFICANT CHANGE UP (ref 13–17)
MCHC RBC-ENTMCNC: 30.5 PG — SIGNIFICANT CHANGE UP (ref 27–34)
MCHC RBC-ENTMCNC: 32.8 GM/DL — SIGNIFICANT CHANGE UP (ref 32–36)
MCV RBC AUTO: 93 FL — SIGNIFICANT CHANGE UP (ref 80–100)
NRBC # BLD: 0 /100 WBCS — SIGNIFICANT CHANGE UP (ref 0–0)
PLATELET # BLD AUTO: 162 K/UL — SIGNIFICANT CHANGE UP (ref 150–400)
POTASSIUM SERPL-MCNC: 3.7 MMOL/L — SIGNIFICANT CHANGE UP (ref 3.5–5.3)
POTASSIUM SERPL-SCNC: 3.7 MMOL/L — SIGNIFICANT CHANGE UP (ref 3.5–5.3)
RBC # BLD: 4.98 M/UL — SIGNIFICANT CHANGE UP (ref 4.2–5.8)
RBC # FLD: 14.5 % — SIGNIFICANT CHANGE UP (ref 10.3–14.5)
SODIUM SERPL-SCNC: 137 MMOL/L — SIGNIFICANT CHANGE UP (ref 135–145)
WBC # BLD: 6.58 K/UL — SIGNIFICANT CHANGE UP (ref 3.8–10.5)
WBC # FLD AUTO: 6.58 K/UL — SIGNIFICANT CHANGE UP (ref 3.8–10.5)

## 2022-12-22 PROCEDURE — 93010 ELECTROCARDIOGRAM REPORT: CPT | Mod: NC

## 2022-12-22 NOTE — H&P PST ADULT - NSICDXPASTSURGICALHX_GEN_ALL_CORE_FT
PAST SURGICAL HISTORY:  H/O left knee surgery     History of right hip replacement     S/P gastric sleeve procedure

## 2022-12-22 NOTE — H&P PST ADULT - PROBLEM SELECTOR PLAN 5
Pt instructed to take meds as prescribed  Pt reports he was instructed to hold Xarelto 3 days before surgery

## 2022-12-22 NOTE — H&P PST ADULT - NSANTHOSAYNRD_GEN_A_CORE
No. MADHU screening performed.  STOP BANG Legend: 0-2 = LOW Risk; 3-4 = INTERMEDIATE Risk; 5-8 = HIGH Risk

## 2022-12-22 NOTE — H&P PST ADULT - NSICDXPASTMEDICALHX_GEN_ALL_CORE_FT
PAST MEDICAL HISTORY:  Atrial fibrillation, unspecified type     Hypertension     Obesity     Stroke

## 2022-12-22 NOTE — H&P PST ADULT - PATIENT'S GENDER IDENTITY
Showering allowed/Stairs allowed/Walking - Indoors allowed/No heavy lifting/straining/Walking - Outdoors allowed Male

## 2022-12-22 NOTE — H&P PST ADULT - PROBLEM SELECTOR PLAN 1
Pre-op instructions given. Pt verbalized understanding  Chlorhexidine wash instructions given  Pt instructed to HOLD all NSAID, Herbal tea/supplements, 7 days before surgery   Pending: M/C for abnormal ecg   Pt reports he was instructed to hold Xarelto 3 days before surgery   Pending: Covid pcr test/results

## 2023-01-05 PROCEDURE — 85027 COMPLETE CBC AUTOMATED: CPT

## 2023-01-05 PROCEDURE — 93005 ELECTROCARDIOGRAM TRACING: CPT

## 2023-01-05 PROCEDURE — G0463: CPT

## 2023-01-05 PROCEDURE — U0003: CPT

## 2023-01-05 PROCEDURE — 36415 COLL VENOUS BLD VENIPUNCTURE: CPT

## 2023-01-05 PROCEDURE — 80048 BASIC METABOLIC PNL TOTAL CA: CPT

## 2023-01-09 ENCOUNTER — TRANSCRIPTION ENCOUNTER (OUTPATIENT)
Age: 68
End: 2023-01-09

## 2023-01-09 NOTE — ASU PATIENT PROFILE, ADULT - NSICDXPASTMEDICALHX_GEN_ALL_CORE_FT
PAST MEDICAL HISTORY:  Atrial fibrillation, unspecified type     Hypertension     Obesity     Stroke visual disturbance

## 2023-01-09 NOTE — ASU PATIENT PROFILE, ADULT - TEACHING/LEARNING DEVELOPMENTAL CONSIDERATIONS
[address] [place], [address]  Tel.  [phone]  Fax. [address] [facility] [address]  [place], [address]  Tel.  [phone]  Fax. [phone] [address]  Tel.  [phone]  Fax. [phone]     Learning About CPAP for Sleep Apnea  What is CPAP? CPAP is a small machine that you use at home every night while you sleep. It increases air pressure in your throat to keep your airway open. When you have sleep apnea, this can help you sleep better so you feel much better. CPAP stands for \"continuous positive airway pressure. \"  The CPAP machine will have one of the following:  · A mask that covers your nose and mouth  · Prongs that fit into your nose  · A mask that covers your nose only, the most common type. This type is called NCPAP. The N stands for \"nasal.\"  Why is it done? CPAP is usually the best treatment for obstructive sleep apnea. It is the first treatment choice and the most widely used. Your doctor may suggest CPAP if you have:  · Moderate to severe sleep apnea. · Sleep apnea and coronary artery disease (CAD) or heart failure. How does it help? · CPAP can help you have more normal sleep, so you feel less sleepy and more alert during the daytime. · CPAP may help keep heart failure or other heart problems from getting worse. · NCPAP may help lower your blood pressure. · If you use CPAP, your bed partner may also sleep better because you are not snoring or restless. What are the side effects? Some people who use CPAP have:  · A dry or stuffy nose and a sore throat. · Irritated skin on the face. · Sore eyes. · Bloating. If you have any of these problems, work with your doctor to fix them. Here are some things you can try:  · Be sure the mask or nasal prongs fit well. · See if your doctor can adjust the pressure of your CPAP. · If your nose is dry, try a humidifier.   · If your nose is runny or stuffy, try none decongestant medicine or a steroid nasal spray. If these things do not help, you might try a different type of machine. Some machines have air pressure that adjusts on its own. Others have air pressures that are different when you breathe in than when you breathe out. This may reduce discomfort caused by too much pressure in your nose. Where can you learn more? Go to [employer]ph[employer]  Enter Edgar Silva in the search box to learn more about \"Learning About CPAP for Sleep Apnea. \"   © 0069-3400 Healthwise, Incorporated. Care instructions adapted under license by [facility] [employer] (which disclaims liability or warranty for this information). This care instruction is for use with your licensed healthcare professional. If you have questions about a medical condition or this instruction, always ask your healthcare professional. [address] any warranty or liability for your use of this information. Content Version: 7.6.57814; Last Revised: January 11, 2010  PROPER SLEEP HYGIENE    What to avoid  · Do not have drinks with caffeine, such as coffee or black tea, for 8 hours before bed. · Do not smoke or use other types of tobacco near bedtime. Nicotine is a stimulant and can keep you awake. · Avoid drinking alcohol late in the evening, because it can cause you to wake in the middle of the night. · Do not eat a big meal close to bedtime. If you are hungry, eat a light snack. · Do not drink a lot of water close to bedtime, because the need to urinate may wake you up during the night. · Do not read or watch TV in bed. Use the bed only for sleeping and sexual activity. What to try  · Go to bed at the same time every night, and wake up at the same time every morning. Do not take naps during the day. · Keep your bedroom quiet, dark, and cool. · Get regular exercise, but not within 3 to 4 hours of your bedtime. .  · Sleep on a comfortable pillow and mattress.   · If watching the clock makes you anxious, turn it facing away from you so you cannot see the time. · If you worry when you lie down, start a worry book. Well before bedtime, write down your worries, and then set the book and your concerns aside. · Try meditation or other relaxation techniques before you go to bed. · If you cannot fall asleep, get up and go to another room until you feel sleepy. Do something relaxing. Repeat your bedtime routine before you go to bed again. · Make your house quiet and calm about an hour before bedtime. Turn down the lights, turn off the TV, log off the computer, and turn down the volume on music. This can help you relax after a busy day. Drowsy Driving: The [employer] cites drowsiness as a causing factor in more than 138,953 police reported crashes annually, resulting in 76,000 injuries and 1,500 deaths. Other surveys suggest 55% of people polled have driven while drowsy in the past year, 23% had fallen asleep but not crashed, 3% crashed, and 2% had and accident due to drowsy driving. Who is at risk? Young Drivers: One study of drowsy driving accidents states that 55% of the drivers were under 25 years. Of those, 75% were male. Shift Workers and Travelers: People who work overnight or travel across time zones frequently are at higher risk of experiencing Circadian Rhythm Disorders. They are trying to work and function when their body is programed to sleep. Sleep Deprived: Lack of sleep has a serious impact on your ability to pay attention or focus on a task. Consistently getting less than the average of 8 hours your body needs creates partial or cumulative sleep deprivation. Untreated Sleep Disorders: Sleep Apnea, Narcolepsy, R.L.S., and other sleep disorders (untreated) prevent a person from getting enough restful sleep. This leads to excessive daytime sleepiness and increases the risk for drowsy driving accidents by up to 7 times.   Medications / Alcohol: Even over the counter medications can cause drowsiness. Medications that impair a drivers attention should have a warning label. Alcohol naturally makes you sleepy and on its own can cause accidents. Combined with excessive drowsiness its effects are amplified. Signs of Drowsy Driving:   * You don't remember driving the last few miles   * You may drift out of your briana   * You are unable to focus and your thoughts wander   * You may yawn more often than normal   * You have difficulty keeping your eyes open / nodding off   * Missing traffic signs, speeding, or tailgating  Prevention-   Good sleep hygiene, lifestyle and behavioral choices have the most impact on drowsy driving. There is no substitute for sleep and the average person requires 8 hours nightly. If you find yourself driving drowsy, stop and sleep. Consider the sleep hygiene tips provided during your visit as well. Medication Refill Policy: Refills for all medications require 1 week advance notice. Please have your pharmacy fax a refill request. We are unable to fax, or call in \"controled substance\" medications and you will need to pick these prescriptions up from our office. Bocom Activation    Thank you for requesting access to Bocom. Please follow the instructions below to securely access and download your online medical record. Bocom allows you to send messages to your doctor, view your test results, renew your prescriptions, schedule appointments, and more. How Do I Sign Up? 1. In your internet browser, go to [email or web address]. 2. Click on the First Time User? Click Here link in the Sign In box. You will see the New Member Sign Up page. 3. Enter your Bocom Access Code exactly as it appears below. You will not need to use this code after youve completed the sign-up process. If you do not sign up before the expiration date, you must request a new code. Bocom Access Code:  Activation code not generated  Current Journeys Status: Active (This is the date your Journeys access code will )    4. Enter the last four digits of your Social Security Number (xxxx) and Date of Birth (mm/dd/yyyy) as indicated and click Submit. You will be taken to the next sign-up page. 5. Create a Futurelyticst ID. This will be your Futurelyticst login ID and cannot be changed, so think of one that is secure and easy to remember. 6. Create a Journeys password. You can change your password at any time. 7. Enter your Password Reset Question and Answer. This can be used at a later time if you forget your password. 8. Enter your e-mail address. You will receive e-mail notification when new information is available in [address] 9. Click Sign Up. You can now view and download portions of your medical record. 10. Click the Download Summary menu link to download a portable copy of your medical information. Additional Information    If you have questions, please call [phone]. Remember, Journeys is NOT to be used for urgent needs. For medical emergencies, dial 911.

## 2023-01-09 NOTE — ASU PATIENT PROFILE, ADULT - NSICDXPASTSURGICALHX_GEN_ALL_CORE_FT
PAST SURGICAL HISTORY:  H/O left knee surgery     History of right hip replacement titanium hip    S/P gastric sleeve procedure

## 2023-01-10 ENCOUNTER — TRANSCRIPTION ENCOUNTER (OUTPATIENT)
Age: 68
End: 2023-01-10

## 2023-01-10 ENCOUNTER — OUTPATIENT (OUTPATIENT)
Dept: OUTPATIENT SERVICES | Facility: HOSPITAL | Age: 68
LOS: 1 days | End: 2023-01-10
Payer: SELF-PAY

## 2023-01-10 VITALS
OXYGEN SATURATION: 99 % | RESPIRATION RATE: 16 BRPM | HEIGHT: 68.5 IN | DIASTOLIC BLOOD PRESSURE: 78 MMHG | SYSTOLIC BLOOD PRESSURE: 128 MMHG | TEMPERATURE: 98 F | HEART RATE: 64 BPM | WEIGHT: 296.52 LBS

## 2023-01-10 DIAGNOSIS — Z41.1 ENCOUNTER FOR COSMETIC SURGERY: ICD-10-CM

## 2023-01-10 DIAGNOSIS — Z96.641 PRESENCE OF RIGHT ARTIFICIAL HIP JOINT: Chronic | ICD-10-CM

## 2023-01-10 DIAGNOSIS — Z90.3 ACQUIRED ABSENCE OF STOMACH [PART OF]: Chronic | ICD-10-CM

## 2023-01-10 DIAGNOSIS — Z98.890 OTHER SPECIFIED POSTPROCEDURAL STATES: Chronic | ICD-10-CM

## 2023-01-10 DIAGNOSIS — E88.1 LIPODYSTROPHY, NOT ELSEWHERE CLASSIFIED: ICD-10-CM

## 2023-01-10 RX ORDER — SODIUM CHLORIDE 9 MG/ML
1000 INJECTION, SOLUTION INTRAVENOUS
Refills: 0 | Status: DISCONTINUED | OUTPATIENT
Start: 2023-01-10 | End: 2023-01-10

## 2023-01-10 RX ORDER — ASPIRIN/CALCIUM CARB/MAGNESIUM 324 MG
81 TABLET ORAL DAILY
Refills: 0 | Status: DISCONTINUED | OUTPATIENT
Start: 2023-01-11 | End: 2023-01-24

## 2023-01-10 RX ORDER — OXYCODONE HYDROCHLORIDE 5 MG/1
5 TABLET ORAL EVERY 6 HOURS
Refills: 0 | Status: DISCONTINUED | OUTPATIENT
Start: 2023-01-10 | End: 2023-01-10

## 2023-01-10 RX ORDER — SODIUM CHLORIDE 9 MG/ML
1000 INJECTION, SOLUTION INTRAVENOUS
Refills: 0 | Status: DISCONTINUED | OUTPATIENT
Start: 2023-01-10 | End: 2023-01-24

## 2023-01-10 RX ORDER — HYDROMORPHONE HYDROCHLORIDE 2 MG/ML
0.25 INJECTION INTRAMUSCULAR; INTRAVENOUS; SUBCUTANEOUS EVERY 4 HOURS
Refills: 0 | Status: DISCONTINUED | OUTPATIENT
Start: 2023-01-10 | End: 2023-01-10

## 2023-01-10 RX ORDER — HYDROMORPHONE HYDROCHLORIDE 2 MG/ML
0.5 INJECTION INTRAMUSCULAR; INTRAVENOUS; SUBCUTANEOUS
Refills: 0 | Status: DISCONTINUED | OUTPATIENT
Start: 2023-01-10 | End: 2023-01-10

## 2023-01-10 RX ORDER — ONDANSETRON 8 MG/1
4 TABLET, FILM COATED ORAL ONCE
Refills: 0 | Status: DISCONTINUED | OUTPATIENT
Start: 2023-01-10 | End: 2023-01-10

## 2023-01-10 RX ORDER — CEFAZOLIN SODIUM 1 G
3000 VIAL (EA) INJECTION EVERY 8 HOURS
Refills: 0 | Status: COMPLETED | OUTPATIENT
Start: 2023-01-10 | End: 2023-01-11

## 2023-01-10 RX ORDER — ATORVASTATIN CALCIUM 80 MG/1
10 TABLET, FILM COATED ORAL AT BEDTIME
Refills: 0 | Status: DISCONTINUED | OUTPATIENT
Start: 2023-01-10 | End: 2023-01-24

## 2023-01-10 RX ORDER — HEPARIN SODIUM 5000 [USP'U]/ML
5000 INJECTION INTRAVENOUS; SUBCUTANEOUS ONCE
Refills: 0 | Status: COMPLETED | OUTPATIENT
Start: 2023-01-10 | End: 2023-01-10

## 2023-01-10 RX ORDER — HYDROMORPHONE HYDROCHLORIDE 2 MG/ML
1 INJECTION INTRAMUSCULAR; INTRAVENOUS; SUBCUTANEOUS
Refills: 0 | Status: DISCONTINUED | OUTPATIENT
Start: 2023-01-10 | End: 2023-01-10

## 2023-01-10 RX ORDER — METOPROLOL TARTRATE 50 MG
50 TABLET ORAL DAILY
Refills: 0 | Status: DISCONTINUED | OUTPATIENT
Start: 2023-01-10 | End: 2023-01-24

## 2023-01-10 RX ORDER — RIVAROXABAN 15 MG-20MG
1 KIT ORAL
Qty: 0 | Refills: 0 | DISCHARGE

## 2023-01-10 RX ORDER — ASPIRIN/CALCIUM CARB/MAGNESIUM 324 MG
1 TABLET ORAL
Qty: 0 | Refills: 0 | DISCHARGE

## 2023-01-10 RX ADMIN — Medication 50 MILLIGRAM(S): at 17:35

## 2023-01-10 RX ADMIN — HEPARIN SODIUM 5000 UNIT(S): 5000 INJECTION INTRAVENOUS; SUBCUTANEOUS at 21:28

## 2023-01-10 RX ADMIN — ATORVASTATIN CALCIUM 10 MILLIGRAM(S): 80 TABLET, FILM COATED ORAL at 21:28

## 2023-01-10 RX ADMIN — SODIUM CHLORIDE 50 MILLILITER(S): 9 INJECTION, SOLUTION INTRAVENOUS at 07:04

## 2023-01-10 RX ADMIN — Medication 200 MILLIGRAM(S): at 19:08

## 2023-01-11 ENCOUNTER — TRANSCRIPTION ENCOUNTER (OUTPATIENT)
Age: 68
End: 2023-01-11

## 2023-01-11 VITALS
SYSTOLIC BLOOD PRESSURE: 119 MMHG | TEMPERATURE: 98 F | RESPIRATION RATE: 16 BRPM | OXYGEN SATURATION: 100 % | HEART RATE: 90 BPM | DIASTOLIC BLOOD PRESSURE: 77 MMHG

## 2023-01-11 LAB — SARS-COV-2 N GENE NPH QL NAA+PROBE: NOT DETECTED

## 2023-01-11 PROCEDURE — 15830 EXC EXCESSIVE SKIN ABDOMEN: CPT

## 2023-01-11 PROCEDURE — C9399: CPT

## 2023-01-11 RX ORDER — INFLUENZA VIRUS VACCINE 15; 15; 15; 15 UG/.5ML; UG/.5ML; UG/.5ML; UG/.5ML
0.7 SUSPENSION INTRAMUSCULAR ONCE
Refills: 0 | Status: DISCONTINUED | OUTPATIENT
Start: 2023-01-11 | End: 2023-01-24

## 2023-01-11 RX ADMIN — Medication 81 MILLIGRAM(S): at 07:55

## 2023-01-11 RX ADMIN — Medication 200 MILLIGRAM(S): at 01:19

## 2023-01-11 RX ADMIN — Medication 50 MILLIGRAM(S): at 06:02

## 2023-01-11 NOTE — ASU DISCHARGE PLAN (ADULT/PEDIATRIC) - CARE PROVIDER_API CALL
Laz Chavarria)  Plastic Surgery; Surgery  [address] [facility] [facility], [address]  [place] [address]  Phone: [phone]  Fax: [phone]  Follow Up Time: 1-3 days

## 2023-01-11 NOTE — DISCHARGE NOTE NURSING/CASE MANAGEMENT/SOCIAL WORK - HAVE YOU RECEIVED AT LEAST TWO PFIZER AND/OR MODERNA VACCINATIONS (IN ANY COMBINATION) AND/OR ONE JOHNSON & JOHNSON VACCINATION?
CC:  Zohaib Castillo is here today for Well Adolescent (11 years old visit )   Dad and patient have no concerns     Medications have been reviewed and updated and No medications are needed to be refilled at this time    Patient preferred phone number: [phone]  Ok to leave detailed message on voicemail    Health Maintenance Due   Topic Date Due   • COVID-19 Vaccine (3 - Booster for Pfizer series) 02/22/2022   • HPV Vaccine (2 - 2-dose series) 05/15/2022   • Depression Screening  Never done   • Influenza Vaccine (1) 09/01/2022   • Annual Physical (ages 3-18)  11/15/2022       Patient is due for the topics as listed above and wishes to proceed with them. Orders placed for Immunization(s) COVID-19, HPV and Influenza, Annual Wellness Visit (ages 3-18) and Depression Screening .      Recent Review Flowsheet Data     Date 11/21/2022    Peds PHQ 2 Score 3    Peds PHQ 2 Interpretation Further screening needed    Feeling down, depressed, irritable or hopeless? More than half the days    Little interest or pleasure in activity? Several days    Peds PHQ 9 Score 14    Peds PHQ 9 Interpretation Moderate Depression    Trouble falling or staying asleep or sleeping too much? More than half the days    Poor appetite, weight loss, or overeating? Several days    Feeling tired or having little energy? Nearly every day    Feeling bad about yourself or that you are a failure or have let yourself or family down? Several days    Trouble concentrating on things such as school work, reading, or watching TV? More than half the days    Moving or speaking slowly that other people have noticed or the opposite - being so fidgety or restless that you have been moving around a lot more than usual? More than half the days    Thoughts that you would be better off dead or of hurting yourself in some way? Not at all           Yes

## 2023-01-11 NOTE — PATIENT PROFILE ADULT - FALL HARM RISK - HARM RISK INTERVENTIONS

## 2023-01-11 NOTE — ASU DISCHARGE PLAN (ADULT/PEDIATRIC) - NS MD DC FALL RISK RISK
For information on Fall & Injury Prevention, visit: [email or web address] OR  [email or web address] OR  https://www.cdc.gov/steadi/patient.html

## 2023-01-11 NOTE — DISCHARGE NOTE NURSING/CASE MANAGEMENT/SOCIAL WORK - PATIENT PORTAL LINK FT
You can access the FollowMyHealth Patient Portal offered by [facility] by registering at the following website: [email or web address]. By joining [employer]’s FollowMyHealth portal, you will also be able to view your health information using other applications (apps) compatible with our system.

## 2023-01-11 NOTE — ASU DISCHARGE PLAN (ADULT/PEDIATRIC) - ASU DC SPECIAL INSTRUCTIONSFT
follow instructions given to you by Dr nichols.    wear abdominal binder at all times may remove for bathing  do not lie flat

## 2023-01-11 NOTE — DISCHARGE NOTE NURSING/CASE MANAGEMENT/SOCIAL WORK - NSDCPEFALRISK_GEN_ALL_CORE
For information on Fall & Injury Prevention, visit: [email or web address] OR  [email or web address] OR  https://www.cdc.gov/steadi/patient.html

## 2023-01-11 NOTE — PROGRESS NOTE ADULT - SUBJECTIVE AND OBJECTIVE BOX
POD#1 abdominal lipectomy    Pt ambulating in room anxious to go home. No new complaints. pain controlled voiding tolerating diet no SON no CP   Vital Signs Last 24 Hrs  T(C): 36.7 (11 Jan 2023 05:50), Max: 36.7 (10 Hardeep 2023 20:42)  T(F): 98.1 (11 Jan 2023 05:50), Max: 98.1 (11 Jan 2023 05:50)  HR: 78 (11 Jan 2023 05:50) (62 - 78)  BP: 124/79 (11 Jan 2023 05:50) (110/98 - 131/74)  BP(mean): --  RR: 16 (11 Jan 2023 05:50) (14 - 18)  SpO2: 100% (11 Jan 2023 05:50) (98% - 100%)    Parameters below as of 11 Jan 2023 05:50  Patient On (Oxygen Delivery Method): room air    PE: abdomen:soft non tender  incision clean and dry no active drainage Vinay drains in place     I&O's Detail    10 Hardeep 2023 07:01  -  11 Jan 2023 07:00  --------------------------------------------------------  IN:    Lactated Ringers: 150 mL  Total IN: 150 mL    OUT:    Bulb (mL): 70 mL    Bulb (mL): 45 mL    Voided (mL): 300 mL  Total OUT: 415 mL    Total NET: -265 mL

## 2023-01-12 ENCOUNTER — TRANSCRIPTION ENCOUNTER (OUTPATIENT)
Age: 68
End: 2023-01-12

## 2023-01-12 NOTE — DISCHARGE NOTE PROVIDER - HOSPITAL COURSE
66yo male with medical h/o HTN, Obesity, HDL and Afib on Xarelto, reports Excess skin to abdomen s/p Gastric sleeve w/weight loss x 3 years ago, and presents today for PST for scheduled cosmetic procedure - Abdominal Lipectomy on 1/10/2023 pt tolerated procedure well remainder of hospital course uneventful

## 2023-01-12 NOTE — DISCHARGE NOTE PROVIDER - NSDCMRMEDTOKEN_GEN_ALL_CORE_FT
atorvastatin 10 mg oral tablet: 1 tab(s) orally once a day  Metoprolol Tartrate 50 mg oral tablet: 1  orally once a day

## 2023-03-15 ENCOUNTER — INPATIENT (INPATIENT)
Facility: HOSPITAL | Age: 68
LOS: 1 days | Discharge: HOME | DRG: 603 | End: 2023-03-17
Attending: HOSPITALIST | Admitting: STUDENT IN AN ORGANIZED HEALTH CARE EDUCATION/TRAINING PROGRAM
Payer: COMMERCIAL

## 2023-03-15 VITALS
SYSTOLIC BLOOD PRESSURE: 146 MMHG | OXYGEN SATURATION: 98 % | DIASTOLIC BLOOD PRESSURE: 84 MMHG | HEART RATE: 63 BPM | HEIGHT: 70 IN | WEIGHT: 274.92 LBS | TEMPERATURE: 98 F | RESPIRATION RATE: 18 BRPM

## 2023-03-15 DIAGNOSIS — Z98.890 OTHER SPECIFIED POSTPROCEDURAL STATES: Chronic | ICD-10-CM

## 2023-03-15 DIAGNOSIS — Z96.641 PRESENCE OF RIGHT ARTIFICIAL HIP JOINT: Chronic | ICD-10-CM

## 2023-03-15 DIAGNOSIS — L03.90 CELLULITIS, UNSPECIFIED: ICD-10-CM

## 2023-03-15 DIAGNOSIS — Z90.3 ACQUIRED ABSENCE OF STOMACH [PART OF]: Chronic | ICD-10-CM

## 2023-03-15 PROBLEM — E66.9 OBESITY, UNSPECIFIED: Chronic | Status: ACTIVE | Noted: 2022-12-22

## 2023-03-15 PROBLEM — I63.9 CEREBRAL INFARCTION, UNSPECIFIED: Chronic | Status: ACTIVE | Noted: 2017-07-10

## 2023-03-15 PROBLEM — I10 ESSENTIAL (PRIMARY) HYPERTENSION: Chronic | Status: ACTIVE | Noted: 2022-12-22

## 2023-03-15 LAB
ALBUMIN SERPL ELPH-MCNC: 2.9 G/DL — LOW (ref 3.3–5)
ALP SERPL-CCNC: 114 U/L — SIGNIFICANT CHANGE UP (ref 40–120)
ALT FLD-CCNC: 23 U/L — SIGNIFICANT CHANGE UP (ref 10–45)
ANION GAP SERPL CALC-SCNC: 10 MMOL/L — SIGNIFICANT CHANGE UP (ref 5–17)
AST SERPL-CCNC: 32 U/L — SIGNIFICANT CHANGE UP (ref 10–40)
BASOPHILS # BLD AUTO: 0.05 K/UL — SIGNIFICANT CHANGE UP (ref 0–0.2)
BASOPHILS NFR BLD AUTO: 0.6 % — SIGNIFICANT CHANGE UP (ref 0–2)
BILIRUB SERPL-MCNC: 1 MG/DL — SIGNIFICANT CHANGE UP (ref 0.2–1.2)
BUN SERPL-MCNC: 15 MG/DL — SIGNIFICANT CHANGE UP (ref 7–23)
CALCIUM SERPL-MCNC: 8.9 MG/DL — SIGNIFICANT CHANGE UP (ref 8.4–10.5)
CHLORIDE SERPL-SCNC: 105 MMOL/L — SIGNIFICANT CHANGE UP (ref 96–108)
CO2 SERPL-SCNC: 22 MMOL/L — SIGNIFICANT CHANGE UP (ref 22–31)
CREAT SERPL-MCNC: 0.86 MG/DL — SIGNIFICANT CHANGE UP (ref 0.5–1.3)
EGFR: 94 ML/MIN/1.73M2 — SIGNIFICANT CHANGE UP
EOSINOPHIL # BLD AUTO: 0.22 K/UL — SIGNIFICANT CHANGE UP (ref 0–0.5)
EOSINOPHIL NFR BLD AUTO: 2.6 % — SIGNIFICANT CHANGE UP (ref 0–6)
GLUCOSE SERPL-MCNC: 127 MG/DL — HIGH (ref 70–99)
HCT VFR BLD CALC: 42.1 % — SIGNIFICANT CHANGE UP (ref 39–50)
HGB BLD-MCNC: 13.9 G/DL — SIGNIFICANT CHANGE UP (ref 13–17)
IMM GRANULOCYTES NFR BLD AUTO: 0.5 % — SIGNIFICANT CHANGE UP (ref 0–0.9)
LYMPHOCYTES # BLD AUTO: 1.68 K/UL — SIGNIFICANT CHANGE UP (ref 1–3.3)
LYMPHOCYTES # BLD AUTO: 19.9 % — SIGNIFICANT CHANGE UP (ref 13–44)
MCHC RBC-ENTMCNC: 30 PG — SIGNIFICANT CHANGE UP (ref 27–34)
MCHC RBC-ENTMCNC: 33 GM/DL — SIGNIFICANT CHANGE UP (ref 32–36)
MCV RBC AUTO: 90.9 FL — SIGNIFICANT CHANGE UP (ref 80–100)
MONOCYTES # BLD AUTO: 0.46 K/UL — SIGNIFICANT CHANGE UP (ref 0–0.9)
MONOCYTES NFR BLD AUTO: 5.5 % — SIGNIFICANT CHANGE UP (ref 2–14)
NEUTROPHILS # BLD AUTO: 5.98 K/UL — SIGNIFICANT CHANGE UP (ref 1.8–7.4)
NEUTROPHILS NFR BLD AUTO: 70.9 % — SIGNIFICANT CHANGE UP (ref 43–77)
NRBC # BLD: 0 /100 WBCS — SIGNIFICANT CHANGE UP (ref 0–0)
PLATELET # BLD AUTO: 181 K/UL — SIGNIFICANT CHANGE UP (ref 150–400)
POTASSIUM SERPL-MCNC: 4.8 MMOL/L — SIGNIFICANT CHANGE UP (ref 3.5–5.3)
POTASSIUM SERPL-SCNC: 4.8 MMOL/L — SIGNIFICANT CHANGE UP (ref 3.5–5.3)
PROT SERPL-MCNC: 7.1 G/DL — SIGNIFICANT CHANGE UP (ref 6–8.3)
RBC # BLD: 4.63 M/UL — SIGNIFICANT CHANGE UP (ref 4.2–5.8)
RBC # FLD: 13.6 % — SIGNIFICANT CHANGE UP (ref 10.3–14.5)
SARS-COV-2 RNA SPEC QL NAA+PROBE: SIGNIFICANT CHANGE UP
SODIUM SERPL-SCNC: 137 MMOL/L — SIGNIFICANT CHANGE UP (ref 135–145)
WBC # BLD: 8.43 K/UL — SIGNIFICANT CHANGE UP (ref 3.8–10.5)
WBC # FLD AUTO: 8.43 K/UL — SIGNIFICANT CHANGE UP (ref 3.8–10.5)

## 2023-03-15 PROCEDURE — 99222 1ST HOSP IP/OBS MODERATE 55: CPT

## 2023-03-15 PROCEDURE — 93010 ELECTROCARDIOGRAM REPORT: CPT

## 2023-03-15 PROCEDURE — 99285 EMERGENCY DEPT VISIT HI MDM: CPT

## 2023-03-15 RX ORDER — ASPIRIN/CALCIUM CARB/MAGNESIUM 324 MG
81 TABLET ORAL DAILY
Refills: 0 | Status: DISCONTINUED | OUTPATIENT
Start: 2023-03-15 | End: 2023-03-17

## 2023-03-15 RX ORDER — VANCOMYCIN HCL 1 G
1250 VIAL (EA) INTRAVENOUS EVERY 12 HOURS
Refills: 0 | Status: DISCONTINUED | OUTPATIENT
Start: 2023-03-15 | End: 2023-03-16

## 2023-03-15 RX ORDER — VANCOMYCIN HCL 1 G
1000 VIAL (EA) INTRAVENOUS ONCE
Refills: 0 | Status: COMPLETED | OUTPATIENT
Start: 2023-03-15 | End: 2023-03-15

## 2023-03-15 RX ORDER — RIVAROXABAN 15 MG-20MG
20 KIT ORAL
Refills: 0 | Status: DISCONTINUED | OUTPATIENT
Start: 2023-03-15 | End: 2023-03-17

## 2023-03-15 RX ORDER — SACCHAROMYCES BOULARDII 250 MG
250 POWDER IN PACKET (EA) ORAL DAILY
Refills: 0 | Status: DISCONTINUED | OUTPATIENT
Start: 2023-03-15 | End: 2023-03-17

## 2023-03-15 RX ORDER — CEFEPIME 1 G/1
1000 INJECTION, POWDER, FOR SOLUTION INTRAMUSCULAR; INTRAVENOUS ONCE
Refills: 0 | Status: COMPLETED | OUTPATIENT
Start: 2023-03-15 | End: 2023-03-15

## 2023-03-15 RX ORDER — POLYETHYLENE GLYCOL 3350 17 G/17G
17 POWDER, FOR SOLUTION ORAL DAILY
Refills: 0 | Status: DISCONTINUED | OUTPATIENT
Start: 2023-03-15 | End: 2023-03-17

## 2023-03-15 RX ORDER — SODIUM CHLORIDE 9 MG/ML
1000 INJECTION INTRAMUSCULAR; INTRAVENOUS; SUBCUTANEOUS ONCE
Refills: 0 | Status: COMPLETED | OUTPATIENT
Start: 2023-03-15 | End: 2023-03-15

## 2023-03-15 RX ORDER — LANOLIN ALCOHOL/MO/W.PET/CERES
6 CREAM (GRAM) TOPICAL AT BEDTIME
Refills: 0 | Status: DISCONTINUED | OUTPATIENT
Start: 2023-03-15 | End: 2023-03-17

## 2023-03-15 RX ORDER — PANTOPRAZOLE SODIUM 20 MG/1
40 TABLET, DELAYED RELEASE ORAL
Refills: 0 | Status: DISCONTINUED | OUTPATIENT
Start: 2023-03-15 | End: 2023-03-17

## 2023-03-15 RX ORDER — METOPROLOL TARTRATE 50 MG
50 TABLET ORAL DAILY
Refills: 0 | Status: DISCONTINUED | OUTPATIENT
Start: 2023-03-15 | End: 2023-03-17

## 2023-03-15 RX ORDER — ATORVASTATIN CALCIUM 80 MG/1
10 TABLET, FILM COATED ORAL AT BEDTIME
Refills: 0 | Status: DISCONTINUED | OUTPATIENT
Start: 2023-03-15 | End: 2023-03-17

## 2023-03-15 RX ORDER — CEFEPIME 1 G/1
1000 INJECTION, POWDER, FOR SOLUTION INTRAMUSCULAR; INTRAVENOUS EVERY 8 HOURS
Refills: 0 | Status: DISCONTINUED | OUTPATIENT
Start: 2023-03-15 | End: 2023-03-16

## 2023-03-15 RX ORDER — SODIUM CHLORIDE 9 MG/ML
1000 INJECTION INTRAMUSCULAR; INTRAVENOUS; SUBCUTANEOUS
Refills: 0 | Status: DISCONTINUED | OUTPATIENT
Start: 2023-03-15 | End: 2023-03-16

## 2023-03-15 RX ADMIN — SODIUM CHLORIDE 65 MILLILITER(S): 9 INJECTION INTRAMUSCULAR; INTRAVENOUS; SUBCUTANEOUS at 17:44

## 2023-03-15 RX ADMIN — CEFEPIME 100 MILLIGRAM(S): 1 INJECTION, POWDER, FOR SOLUTION INTRAMUSCULAR; INTRAVENOUS at 21:42

## 2023-03-15 RX ADMIN — SODIUM CHLORIDE 1000 MILLILITER(S): 9 INJECTION INTRAMUSCULAR; INTRAVENOUS; SUBCUTANEOUS at 13:55

## 2023-03-15 RX ADMIN — RIVAROXABAN 20 MILLIGRAM(S): KIT at 18:11

## 2023-03-15 RX ADMIN — Medication 166.67 MILLIGRAM(S): at 17:41

## 2023-03-15 RX ADMIN — CEFEPIME 100 MILLIGRAM(S): 1 INJECTION, POWDER, FOR SOLUTION INTRAMUSCULAR; INTRAVENOUS at 12:55

## 2023-03-15 RX ADMIN — Medication 1000 MILLIGRAM(S): at 13:59

## 2023-03-15 RX ADMIN — CEFEPIME 1000 MILLIGRAM(S): 1 INJECTION, POWDER, FOR SOLUTION INTRAMUSCULAR; INTRAVENOUS at 13:25

## 2023-03-15 RX ADMIN — Medication 250 MILLIGRAM(S): at 21:40

## 2023-03-15 RX ADMIN — SODIUM CHLORIDE 1000 MILLILITER(S): 9 INJECTION INTRAMUSCULAR; INTRAVENOUS; SUBCUTANEOUS at 12:55

## 2023-03-15 RX ADMIN — Medication 250 MILLIGRAM(S): at 12:59

## 2023-03-15 NOTE — ED PROVIDER NOTE - CLINICAL SUMMARY MEDICAL DECISION MAKING FREE TEXT BOX
66yo male with medical h/o HTN, Obesity, HDL and Afib on Xarelto here for cellulitis of lle. pt had been on doxy for 5 days with no improvement  denies numbness, tingling, weakness, fever, chills. sent in by Dr. Chavarria and PCP  had doppler done 3 days ago and was neg for DVT  hx of MRSA. will admit for iv abx

## 2023-03-15 NOTE — ED PROVIDER NOTE - PHYSICAL EXAMINATION
General:     NAD  Eyes: PERRL  Head:     NC/AT, oral mucosa moist  Neck:     trachea midline  Lungs:     CTA b/l  CVS:     RRR  Abd:     no distension  Ext:   b/l LE edema. LLE erythema of left anterior leg wo fluctuance  Neuro: AAOx3

## 2023-03-15 NOTE — PATIENT PROFILE ADULT - FALL HARM RISK - ATTEMPT OOB
bilateral upper extremity Active ROM was WFL (within functional limits)/Right LE Active ROM was WFL (within functional limits)
No

## 2023-03-15 NOTE — H&P ADULT - HISTORY OF PRESENT ILLNESS
66 y/o M with PMH obesity, CAD, chronic afib, HTN    c/o unimproved cellulitis after several days of doxycycline per PCP.     ED course: /84, HR 63, T 97.9F, O2 98% on RA 68 y/o M with PMH obesity, CAD, chronic afib, HLD c/o LLE anterior tibial edema, erythema x 2 weeks, went to PCP 1 week ago dx doxycycline, endorses adherence to antibiotics, noted minimal clinical improvement at follow up appt 2 days ago, at which time PCP recommended ED eval for IV antibiotics. Denies headache, fever, chills, cp, sob, n/v, abd pain. Denies trauma to LLE however thinks he had small cut/abrasion from golfing.     ED course: /84, HR 63, T 97.9F, O2 98% on RA

## 2023-03-15 NOTE — ED PROVIDER NOTE - NS ED ATTENDING STATEMENT MOD
This was a shared visit with the VALENCIA. I reviewed and verified the documentation and independently performed the documented:

## 2023-03-15 NOTE — H&P ADULT - NSHPPHYSICALEXAM_GEN_ALL_CORE
T(C): 36.6 (03-15-23 @ 11:46), Max: 36.6 (03-15-23 @ 11:46)  HR: 63 (03-15-23 @ 11:46) (63 - 63)  BP: 146/84 (03-15-23 @ 11:46) (146/84 - 146/84)  RR: 18 (03-15-23 @ 11:46) (18 - 18)  SpO2: 98% (03-15-23 @ 11:46) (98% - 98%)  Wt(kg): --Vital Signs Last 24 Hrs  T(C): 36.6 (15 Mar 2023 11:46), Max: 36.6 (15 Mar 2023 11:46)  T(F): 97.9 (15 Mar 2023 11:46), Max: 97.9 (15 Mar 2023 11:46)  HR: 63 (15 Mar 2023 11:46) (63 - 63)  BP: 146/84 (15 Mar 2023 11:46) (146/84 - 146/84)  BP(mean): --  RR: 18 (15 Mar 2023 11:46) (18 - 18)  SpO2: 98% (15 Mar 2023 11:46) (98% - 98%)    Parameters below as of 15 Mar 2023 11:46  Patient On (Oxygen Delivery Method): room air        PHYSICAL EXAM:  GENERAL: NAD, well-groomed, well-developed  HEAD:  Atraumatic, Normocephalic  EYES: EOMI, PERRLA, conjunctiva and sclera clear  ENMT: No tonsillar erythema, exudates, or enlargement; Moist mucous membranes, Good dentition, No lesions  NECK: Supple, No JVD, Normal thyroid  NERVOUS SYSTEM:  Alert & Oriented X3, Good concentration; Motor Strength 5/5 B/L upper and lower extremities; DTRs 2+ intact and symmetric  CHEST/LUNG: Clear to percussion bilaterally; No rales, rhonchi, wheezing, or rubs  HEART: Regular rate and rhythm; No murmurs, rubs, or gallops  ABDOMEN: Soft, Nontender, Nondistended; Bowel sounds present  EXTREMITIES:  2+ Peripheral Pulses, No clubbing, cyanosis, or edema  LYMPH: No lymphadenopathy noted  SKIN: No rashes or lesions T(C): 36.6 (03-15-23 @ 11:46), Max: 36.6 (03-15-23 @ 11:46)  HR: 63 (03-15-23 @ 11:46) (63 - 63)  BP: 146/84 (03-15-23 @ 11:46) (146/84 - 146/84)  RR: 18 (03-15-23 @ 11:46) (18 - 18)  SpO2: 98% (03-15-23 @ 11:46) (98% - 98%)  Wt(kg): --Vital Signs Last 24 Hrs  T(C): 36.6 (15 Mar 2023 11:46), Max: 36.6 (15 Mar 2023 11:46)  T(F): 97.9 (15 Mar 2023 11:46), Max: 97.9 (15 Mar 2023 11:46)  HR: 63 (15 Mar 2023 11:46) (63 - 63)  BP: 146/84 (15 Mar 2023 11:46) (146/84 - 146/84)  BP(mean): --  RR: 18 (15 Mar 2023 11:46) (18 - 18)  SpO2: 98% (15 Mar 2023 11:46) (98% - 98%)    Parameters below as of 15 Mar 2023 11:46  Patient On (Oxygen Delivery Method): room air        PHYSICAL EXAM:  GENERAL: NAD, well-groomed, well-developed  HEAD:  Atraumatic, Normocephalic  EYES: EOMI, PERRLA, conjunctiva and sclera clear  ENMT: No tonsillar erythema, exudates, or enlargement; Moist mucous membranes, Good dentition, No lesions  NECK: Supple, No JVD, Normal thyroid  NERVOUS SYSTEM:  Alert & Oriented X3, Good concentration; Motor Strength 5/5 B/L upper and lower extremities; DTRs 2+ intact and symmetric  CHEST/LUNG: Clear to percussion bilaterally; No rales, rhonchi, wheezing, or rubs  HEART: Regular rate and rhythm; No murmurs, rubs, or gallops  ABDOMEN: Soft, Nontender, Nondistended; Bowel sounds present  EXTREMITIES: +LLE anterior tibial erythema, edema, ttp medial ankle. RLE no erythema or edema  LYMPH: No lymphadenopathy noted  SKIN: No rashes or lesions

## 2023-03-15 NOTE — ED ADULT NURSE NOTE - OBJECTIVE STATEMENT
Patient came from home with complaint of L leg cellulitis. Patient took oral antibiotics for a week however was told by PMD to come to ED for IV antibiotics. Patient denies any fever, chills, nausea, vomit, SOB or chest pain. PMH of Afib.

## 2023-03-15 NOTE — PATIENT PROFILE ADULT - FALL HARM RISK - UNIVERSAL INTERVENTIONS
Bed in lowest position, wheels locked, appropriate side rails in place/Call bell, personal items and telephone in reach/Instruct patient to call for assistance before getting out of bed or chair/Non-slip footwear when patient is out of bed/[place] to call system/Physically safe environment - no spills, clutter or unnecessary equipment/Purposeful Proactive Rounding/Room/bathroom lighting operational, light cord in reach

## 2023-03-15 NOTE — CONSULT NOTE ADULT - SUBJECTIVE AND OBJECTIVE BOX
HPI:   Patient is a 67y male with a past history of A Fib, CVA, obesity, HTN, gastric sleeve, RT MITCHEL, and LLE cellulitis who was sent to ER to treat LLE swelling and erythema.  He has B/L leg edema and has been treated in past both for cellulitis and venous stasis.He had chills about 10 days ago, noted LLE swelling and warmth, was given doxycycline. He has been on it x 1 week with marginal improvement.He has no additional  fever or chills. He reports a waxing and wanng appearance to left leg. It looks okay in AM, gets swollen with ambulation, and was painful to walk on. He says it is better after 1 dose of vanco and cefepime.    REVIEW OF SYSTEMS:  All other review of systems negative (Comprehensive ROS)    PAST MEDICAL & SURGICAL HISTORY:  Stroke  visual disturbance      Atrial fibrillation, unspecified type      Obesity      Hypertension      H/O left knee surgery      History of right hip replacement  titanium hip      S/P gastric sleeve procedure          Allergies    No Known Allergies    Intolerances        Antimicrobials Day #  :day 1  cefepime   IVPB 1000 milliGRAM(s) IV Intermittent every 8 hours  vancomycin  IVPB 1250 milliGRAM(s) IV Intermittent every 12 hours    Other Medications:  aspirin enteric coated 81 milliGRAM(s) Oral daily  atorvastatin 10 milliGRAM(s) Oral at bedtime  melatonin 6 milliGRAM(s) Oral at bedtime PRN  metoprolol tartrate 50 milliGRAM(s) Oral daily  pantoprazole    Tablet 40 milliGRAM(s) Oral before breakfast  polyethylene glycol 3350 17 Gram(s) Oral daily PRN  rivaroxaban 20 milliGRAM(s) Oral with dinner  saccharomyces boulardii 250 milliGRAM(s) Oral daily  sodium chloride 0.9%. 1000 milliLiter(s) IV Continuous <Continuous>      FAMILY HISTORY:      SOCIAL HISTORY:  Smoking: x    ETOH:  x   Drug Use: x      T(F): 97.9 (03-15-23 @ 11:46), Max: 97.9 (03-15-23 @ 11:46)  HR: 63 (03-15-23 @ 11:46)  BP: 146/84 (03-15-23 @ 11:46)  RR: 18 (03-15-23 @ 11:46)  SpO2: 98% (03-15-23 @ 11:46)  Wt(kg): --    PHYSICAL EXAM:  General: alert, no acute distress  Eyes:  anicteric, no conjunctival injection, no discharge  Oropharynx: no lesions or injection 	  Neck: supple, without adenopathy  Lungs: clear to auscultation  Heart: irregular rate and rhythm; no murmur,   Abdomen: soft, nondistended, nontender, without mass or organomegaly  Skin: no lesions  Extremities: no clubbing, cyanosis, + edema left lower extremity  Neurologic: alert, oriented, moves all extremities  B/L venous stasis changes, left > Rt. Left calf with area of erythema and induration mostly anteriorly  No left groin adenopathy  LAB RESULTS:                        13.9   8.43  )-----------( 181      ( 15 Mar 2023 13:00 )             42.1     03-15    137  |  105  |  15  ----------------------------<  127<H>  4.8   |  22  |  0.86    Ca    8.9      15 Mar 2023 13:00    TPro  7.1  /  Alb  2.9<L>  /  TBili  1.0  /  DBili  x   /  AST  32  /  ALT  23  /  AlkPhos  114  03-15    LIVER FUNCTIONS - ( 15 Mar 2023 13:00 )  Alb: 2.9 g/dL / Pro: 7.1 g/dL / ALK PHOS: 114 U/L / ALT: 23 U/L / AST: 32 U/L / GGT: x               MICROBIOLOGY:  RECENT CULTURES:        RADIOLOGY REVIEWED:

## 2023-03-15 NOTE — ED PROVIDER NOTE - OBJECTIVE STATEMENT
68yo male with medical h/o HTN, Obesity, HDL and Afib on Xarelto here for cellulitis of lle. pt had been on doxy for 5 days with no improvement  denies numbness, tingling, weakness, fever, chills  had doppler done 3 days ago and was neg for DVT

## 2023-03-15 NOTE — ED ADULT TRIAGE NOTE - CHIEF COMPLAINT QUOTE
Pt has left cellulitis treated by Luana with oral antibiotics, but recommended to go to ER for IV antibiotics today.

## 2023-03-15 NOTE — ED PROVIDER NOTE - ATTENDING APP SHARED VISIT CONTRIBUTION OF CARE
68yo male with medical h/o HTN, Obesity, HDL and Afib on Xarelto here for cellulitis of lle. pt had been on doxy for 5 days with no improvement  denies numbness, tingling, weakness, fever, chills. sent in by Dr. Chavarria and PCP  had doppler done 3 days ago and was neg for DVT  hx of MRSA. will admit for iv abx  Dr. Giang:  I have reviewed and discussed with the PA/ resident the case specifics, including the history, physical assessment, evaluation, conclusion, laboratory results, and medical plan. I agree with the contents, and conclusions. I have personally examined, and interviewed the patient.

## 2023-03-15 NOTE — H&P ADULT - NSHPSOCIALHISTORY_GEN_ALL_CORE
Denies tobacco, EtOH, or illicit drug use. Denies tobacco, EtOH, or illicit drug use. Employed - [employer]. PTA independent in ADLs, IADls, and ambulation.

## 2023-03-15 NOTE — H&P ADULT - ASSESSMENT
LE cellulitis, failed PO abx  -Admit to medicine  -s/o vanc, cefepime  -Hx of MRSA, continue vanc, cefepime   -ID consulted ***    HTN  CAD  -Continue home meds ***     Vitals q8h  Diet: DASH  Activity: Bedrest   PT/OT as tolerated  DVT ppx: Lovenox  GI ppx: No indication for GI prophylaxis  Standard precautions: Aspiration, fall, safety, seizure, skin  Code Status  HCP  68 y/o M with PMH obesity, CAD, chronic afib, HLD c/o LLE cellulitis after failing outpatient PO antibiotics.     LE cellulitis, failed PO abx  -Admit to medicine  -Trialed doxycycline outpatient. s/p vanc, cefepime in ED  -Continue vanc, cefepime   -ID consulted    CAD  Chronic Afib  -Continue home meds metoprolol, xarelto    HLD  -Continue statin    Vitals q8h  Diet: DASH  Activity: Bedrest   PT/OT as tolerated  DVT ppx: Lovenox  GI ppx: No indication for GI prophylaxis  Standard precautions: Aspiration, fall, safety, seizure, skin  Code Status: Full Code  HCP: Wife Heather Tai  updated 66 y/o M with PMH obesity, CAD, chronic afib, HLD c/o LLE cellulitis after failing outpatient PO antibiotics.     LE cellulitis, failed PO abx  -Admit to medicine  -Trialed doxycycline outpatient. s/p vanc, cefepime in ED  -Continue vanc, cefepime   -ID consulted    CAD  Chronic Afib  -Continue home meds metoprolol, xarelto    HLD  -Continue statin    Vitals q8h  Diet: DASH  Activity: Bedrest   PT/OT as tolerated  DVT ppx: xarelto  GI ppx: No indication for GI prophylaxis  Standard precautions: Aspiration, fall, safety, seizure, skin  Code Status: Full Code  HCP: Wife Heather Tai  updated 68 y/o M with PMH obesity, CAD, chronic afib, HLD c/o LLE cellulitis after failing outpatient PO antibiotics.     LE cellulitis, failed PO abx  Hx of MRSA  -Admit to medicine  -Trialed doxycycline outpatient. s/p vanc, cefepime in ED  -Continue vanc, cefepime   -ID consulted    CAD  Chronic Afib  -Continue home meds metoprolol, xarelto    HLD  -Continue statin    Vitals q8h  Diet: DASH  Activity: Bedrest   PT/OT as tolerated  DVT ppx: xarelto  GI ppx: No indication for GI prophylaxis  Standard precautions: Aspiration, fall, safety, seizure, skin  Code Status: Full Code  HCP: Wife Heather Tai  updated

## 2023-03-15 NOTE — H&P ADULT - NSHPLABSRESULTS_GEN_ALL_CORE
LABS:                        13.9   8.43  )-----------( 181      ( 15 Mar 2023 13:00 )             42.1                CAPILLARY BLOOD GLUCOSE                RADIOLOGY & ADDITIONAL TESTS:    Consultant(s) Notes Reviewed:  [x ] YES  [ ] NO  Care Discussed with Consultants/Other Providers [ x] YES  [ ] NO  Imaging Personally Reviewed:  [ ] YES  [ ] NO LABS:                        13.9   8.43  )-----------( 181      ( 15 Mar 2023 13:00 )             42.1                CAPILLARY BLOOD GLUCOSE                RADIOLOGY & ADDITIONAL TESTS:      Consultant(s) Notes Reviewed:  [x] YES  [ ] NO  Care Discussed with Consultants/Other Providers [x] YES  [ ] NO  Imaging Personally Reviewed:  [x] YES  [ ] NO

## 2023-03-15 NOTE — CONSULT NOTE ADULT - ASSESSMENT
Patient is a 67y male with a past history of A Fib, CVA, obesity, HTN, gastric sleeve, RT MITCHEL, and LLE cellulitis who was sent to ER to treat LLE swelling and erythema.  He has B/L leg edema and has been treated in past both for cellulitis and venous stasis.He had chills about 10 days ago, noted LLE swelling and warmth, was given doxycycline. He has been on it x 1 week with marginal improvement.He has no additional  fever or chills. He reports a waxing and wanng appearance to left leg. It looks okay in AM, gets swollen with ambulation, and was painful to walk on. He says it is better after 1 dose of vanco and cefepime.  Suspect a partially treated beta streptococcal cellulitis. I do not see any purulence.  We are seeing a combination of venous stasis and post infectious inflammation.  Expect a slow improvement.  Suggest:  1. Leg elevation  2, Dopplers  3, Cefazolin 2 gr q8, I think we can narrow coverage  4.Consider =vascular evaluation, he will eventually benefit from compressive stockings  5. Call placed to review with Dr Colón, could not get through. Patient is a 67y male with a past history of A Fib, CVA, obesity, HTN, gastric sleeve, RT MITCHEL, and LLE cellulitis who was sent to ER to treat LLE swelling and erythema.  He has B/L leg edema and has been treated in past both for cellulitis and venous stasis.He had chills about 10 days ago, noted LLE swelling and warmth, was given doxycycline. He has been on it x 1 week with marginal improvement.He has no additional  fever or chills. He reports a waxing and wanng appearance to left leg. It looks okay in AM, gets swollen with ambulation, and was painful to walk on. He says it is better after 1 dose of vanco and cefepime.  Suspect a partially treated beta streptococcal cellulitis. I do not see any purulence.  We are seeing a combination of venous stasis and post infectious inflammation.  Expect a slow improvement.  Suggest:  1. Leg elevation  2, Dopplers  3, Cefazolin 2 gr q8, I think we can narrow coverage  4.Consider =vascular evaluation, he will eventually benefit from compressive stockings  5. Call placed to review with Dr Colón, could not reach him.

## 2023-03-16 LAB
ALBUMIN SERPL ELPH-MCNC: 2.6 G/DL — LOW (ref 3.3–5)
ALP SERPL-CCNC: 105 U/L — SIGNIFICANT CHANGE UP (ref 40–120)
ALT FLD-CCNC: 16 U/L — SIGNIFICANT CHANGE UP (ref 10–45)
ANION GAP SERPL CALC-SCNC: 8 MMOL/L — SIGNIFICANT CHANGE UP (ref 5–17)
AST SERPL-CCNC: 17 U/L — SIGNIFICANT CHANGE UP (ref 10–40)
BILIRUB SERPL-MCNC: 0.9 MG/DL — SIGNIFICANT CHANGE UP (ref 0.2–1.2)
BUN SERPL-MCNC: 11 MG/DL — SIGNIFICANT CHANGE UP (ref 7–23)
CALCIUM SERPL-MCNC: 8.2 MG/DL — LOW (ref 8.4–10.5)
CHLORIDE SERPL-SCNC: 106 MMOL/L — SIGNIFICANT CHANGE UP (ref 96–108)
CO2 SERPL-SCNC: 24 MMOL/L — SIGNIFICANT CHANGE UP (ref 22–31)
CREAT SERPL-MCNC: 0.73 MG/DL — SIGNIFICANT CHANGE UP (ref 0.5–1.3)
EGFR: 99 ML/MIN/1.73M2 — SIGNIFICANT CHANGE UP
GLUCOSE SERPL-MCNC: 102 MG/DL — HIGH (ref 70–99)
HCT VFR BLD CALC: 38.5 % — LOW (ref 39–50)
HCV AB S/CO SERPL IA: 0.1 S/CO — SIGNIFICANT CHANGE UP (ref 0–0.99)
HCV AB SERPL-IMP: SIGNIFICANT CHANGE UP
HGB BLD-MCNC: 12.6 G/DL — LOW (ref 13–17)
MAGNESIUM SERPL-MCNC: 1.8 MG/DL — SIGNIFICANT CHANGE UP (ref 1.6–2.6)
MCHC RBC-ENTMCNC: 30.4 PG — SIGNIFICANT CHANGE UP (ref 27–34)
MCHC RBC-ENTMCNC: 32.7 GM/DL — SIGNIFICANT CHANGE UP (ref 32–36)
MCV RBC AUTO: 93 FL — SIGNIFICANT CHANGE UP (ref 80–100)
NRBC # BLD: 0 /100 WBCS — SIGNIFICANT CHANGE UP (ref 0–0)
PHOSPHATE SERPL-MCNC: 3.4 MG/DL — SIGNIFICANT CHANGE UP (ref 2.5–4.5)
PLATELET # BLD AUTO: 157 K/UL — SIGNIFICANT CHANGE UP (ref 150–400)
POTASSIUM SERPL-MCNC: 3.9 MMOL/L — SIGNIFICANT CHANGE UP (ref 3.5–5.3)
POTASSIUM SERPL-SCNC: 3.9 MMOL/L — SIGNIFICANT CHANGE UP (ref 3.5–5.3)
PROT SERPL-MCNC: 6.2 G/DL — SIGNIFICANT CHANGE UP (ref 6–8.3)
RBC # BLD: 4.14 M/UL — LOW (ref 4.2–5.8)
RBC # FLD: 13.7 % — SIGNIFICANT CHANGE UP (ref 10.3–14.5)
SODIUM SERPL-SCNC: 138 MMOL/L — SIGNIFICANT CHANGE UP (ref 135–145)
WBC # BLD: 6.49 K/UL — SIGNIFICANT CHANGE UP (ref 3.8–10.5)
WBC # FLD AUTO: 6.49 K/UL — SIGNIFICANT CHANGE UP (ref 3.8–10.5)

## 2023-03-16 PROCEDURE — 99232 SBSQ HOSP IP/OBS MODERATE 35: CPT

## 2023-03-16 RX ORDER — CEFAZOLIN SODIUM 1 G
2000 VIAL (EA) INJECTION EVERY 8 HOURS
Refills: 0 | Status: DISCONTINUED | OUTPATIENT
Start: 2023-03-16 | End: 2023-03-17

## 2023-03-16 RX ORDER — CEFAZOLIN SODIUM 1 G
VIAL (EA) INJECTION
Refills: 0 | Status: DISCONTINUED | OUTPATIENT
Start: 2023-03-16 | End: 2023-03-17

## 2023-03-16 RX ORDER — CEFAZOLIN SODIUM 1 G
2000 VIAL (EA) INJECTION ONCE
Refills: 0 | Status: COMPLETED | OUTPATIENT
Start: 2023-03-16 | End: 2023-03-16

## 2023-03-16 RX ADMIN — Medication 100 MILLIGRAM(S): at 14:11

## 2023-03-16 RX ADMIN — ATORVASTATIN CALCIUM 10 MILLIGRAM(S): 80 TABLET, FILM COATED ORAL at 21:03

## 2023-03-16 RX ADMIN — Medication 50 MILLIGRAM(S): at 05:33

## 2023-03-16 RX ADMIN — CEFEPIME 100 MILLIGRAM(S): 1 INJECTION, POWDER, FOR SOLUTION INTRAMUSCULAR; INTRAVENOUS at 05:25

## 2023-03-16 RX ADMIN — Medication 250 MILLIGRAM(S): at 10:18

## 2023-03-16 RX ADMIN — Medication 100 MILLIGRAM(S): at 10:17

## 2023-03-16 RX ADMIN — RIVAROXABAN 20 MILLIGRAM(S): KIT at 17:49

## 2023-03-16 RX ADMIN — Medication 166.67 MILLIGRAM(S): at 06:19

## 2023-03-16 RX ADMIN — Medication 100 MILLIGRAM(S): at 22:00

## 2023-03-16 RX ADMIN — PANTOPRAZOLE SODIUM 40 MILLIGRAM(S): 20 TABLET, DELAYED RELEASE ORAL at 05:25

## 2023-03-16 RX ADMIN — Medication 81 MILLIGRAM(S): at 10:18

## 2023-03-17 ENCOUNTER — TRANSCRIPTION ENCOUNTER (OUTPATIENT)
Age: 68
End: 2023-03-17

## 2023-03-17 VITALS
TEMPERATURE: 98 F | HEART RATE: 86 BPM | OXYGEN SATURATION: 96 % | SYSTOLIC BLOOD PRESSURE: 116 MMHG | DIASTOLIC BLOOD PRESSURE: 78 MMHG

## 2023-03-17 PROCEDURE — 85027 COMPLETE CBC AUTOMATED: CPT

## 2023-03-17 PROCEDURE — 85025 COMPLETE CBC W/AUTO DIFF WBC: CPT

## 2023-03-17 PROCEDURE — 96365 THER/PROPH/DIAG IV INF INIT: CPT

## 2023-03-17 PROCEDURE — 99239 HOSP IP/OBS DSCHRG MGMT >30: CPT

## 2023-03-17 PROCEDURE — 99285 EMERGENCY DEPT VISIT HI MDM: CPT | Mod: 25

## 2023-03-17 PROCEDURE — 80053 COMPREHEN METABOLIC PANEL: CPT

## 2023-03-17 PROCEDURE — 84100 ASSAY OF PHOSPHORUS: CPT

## 2023-03-17 PROCEDURE — 96367 TX/PROPH/DG ADDL SEQ IV INF: CPT

## 2023-03-17 PROCEDURE — 36415 COLL VENOUS BLD VENIPUNCTURE: CPT

## 2023-03-17 PROCEDURE — 93005 ELECTROCARDIOGRAM TRACING: CPT

## 2023-03-17 PROCEDURE — 87635 SARS-COV-2 COVID-19 AMP PRB: CPT

## 2023-03-17 PROCEDURE — 83735 ASSAY OF MAGNESIUM: CPT

## 2023-03-17 PROCEDURE — 86803 HEPATITIS C AB TEST: CPT

## 2023-03-17 RX ORDER — SACCHAROMYCES BOULARDII 250 MG
1 POWDER IN PACKET (EA) ORAL
Qty: 10 | Refills: 0
Start: 2023-03-17 | End: 2023-03-26

## 2023-03-17 RX ADMIN — Medication 250 MILLIGRAM(S): at 11:19

## 2023-03-17 RX ADMIN — Medication 100 MILLIGRAM(S): at 08:11

## 2023-03-17 RX ADMIN — PANTOPRAZOLE SODIUM 40 MILLIGRAM(S): 20 TABLET, DELAYED RELEASE ORAL at 05:54

## 2023-03-17 RX ADMIN — Medication 50 MILLIGRAM(S): at 05:54

## 2023-03-17 RX ADMIN — Medication 81 MILLIGRAM(S): at 11:18

## 2023-03-17 NOTE — DISCHARGE NOTE PROVIDER - NSDCCPCAREPLAN_GEN_ALL_CORE_FT
PRINCIPAL DISCHARGE DIAGNOSIS  Diagnosis: Cellulitis  Assessment and Plan of Treatment:   -per Infectious Disease Doctor you can change to oral antibiotics for 10 days: Cefadroxil 1000 mg. twice daily  -keep left leg elevated as much as you can  -follow up with your Vascular Physician within the next 7 days      SECONDARY DISCHARGE DIAGNOSES  Diagnosis: PVD (peripheral vascular disease)  Assessment and Plan of Treatment: -as above, follow up with your Vascular Surgeon

## 2023-03-17 NOTE — DISCHARGE NOTE PROVIDER - NSDCMRMEDTOKEN_GEN_ALL_CORE_FT
Aspirin Enteric Coated 81 mg oral delayed release tablet: 1 tab(s) orally once a day  atorvastatin 10 mg oral tablet: 1 tab(s) orally once a day  cefadroxil 1000 mg oral tablet: 1 tab(s) orally every 12 hours   Metoprolol Tartrate 50 mg oral tablet: 1  orally once a day  saccharomyces boulardii lyo 250 mg oral capsule: 1 cap(s) orally once a day  Xarelto 20 mg oral tablet: 1 tab(s) orally once a day (in the evening)

## 2023-03-17 NOTE — DISCHARGE NOTE PROVIDER - ATTENDING DISCHARGE PHYSICAL EXAMINATION:
GENERAL: NAD  HEART: Regular rate and rhythm; No murmurs, rubs, or gallops  ABDOMEN: Soft, Nontender, Nondistended; Bowel sounds present; No HSM  SKIN: Mild redness and warm to touch

## 2023-03-17 NOTE — DISCHARGE NOTE NURSING/CASE MANAGEMENT/SOCIAL WORK - PATIENT PORTAL LINK FT
You can access the FollowMyHealth Patient Portal offered by [facility] by registering at the following website: [email or web address]. By joining [facility]’s FollowMyHealth portal, you will also be able to view your health information using other applications (apps) compatible with our system.

## 2023-03-17 NOTE — DISCHARGE NOTE NURSING/CASE MANAGEMENT/SOCIAL WORK - NSDCFUADDAPPT_GEN_ALL_CORE_FT
We made you a hospital followup appointment with Dr. Dhillon (will see Dr. Winter) on 3/22/23 at 1:00pm, office #[phone].

## 2023-03-17 NOTE — PROGRESS NOTE ADULT - ASSESSMENT
66 y/o M with PMH obesity, CAD, chronic afib, HLD c/o LLE cellulitis after failing outpatient PO antibiotics.     LLE cellulitis, failed PO abx  Hx of MRSA  -ID consulted, s/p  vanc, cefepime in ED  -cont  Cefazolin 2 gmIV q8h, day 3  -pt states he had Vascular studies outpt this week with Dr. Noland, [facility] and studies were normal  -pt refusing any further imaging, dopplers  -pt afebrile, wbc normal range  -cont to monitor vitals  -anticipate change to oral antibx in next 24-48 hrs.    Morbid Obesity  BMI 40.2  -encourage wt loss and low fat diet    CAD  Chronic Afib  -Continue home meds metoprolol, xarelto    HLD  -Continue statin    DVT ppx: xarelto  GI ppx: No indication for GI prophylaxis    Code Status: Full Code    Dispo:  pt states he will update his wife on plan of care.  HCP: Wife Heather Tai  
Patient is a 67y male with a past history of A Fib, CVA, obesity, HTN, gastric sleeve, RT MITCHEL, and LLE cellulitis who was sent to ER to treat LLE swelling and erythema.  He has B/L leg edema and has been treated in past both for cellulitis and venous stasis.He had chills about 10 days ago, noted LLE swelling and warmth, was given doxycycline. He has been on it x 1 week with marginal improvement.He has no additional  fever or chills. He reports a waxing and wanng appearance to left leg. It looks okay in AM, gets swollen with ambulation, and was painful to walk on. He says it is better after 1 dose of vanco and cefepime.  Suspect a partially treated beta streptococcal cellulitis. I do not see any purulence.  We are seeing a combination of venous stasis and post infectious inflammation.The leg elevation and rest are probably as important as the antibiotics at this point.  Expect a slow improvement.He is slightly improved c/y yesterday  Suggest:  1. Leg elevation  2, Consider Dopplers although he is on a DOAC  3, Cefazolin 2 gr q8, I think we can narrow coverage in this case  4.Consider =vascular evaluation, he will eventually benefit from compressive stockings  5. Call placed to review with Dr Colón, could not reach him.Awaiting call back  6.Case reviewed with wife and MLP on the floor
68 y/o M with PMH obesity, CAD, chronic afib, HLD c/o LLE cellulitis after failing outpatient PO antibiotics.     LLE cellulitis, failed PO abx  Hx of MRSA  -ID consulted, s/p  vanc, cefepime in ED  -started on Cefazolin 2 gmIV q8h per ID rec  -pt states he had Vascular studies outpt this week with T[facility]O Forde/[facility] and studies were normal  -pt afebrile, wbc normal range  -cont to monitor vitals  -anticipate change to oral antibx in next 24-48 hrs.    Morbid Obesity  BMI 40.2  -encourage wt loss and low fat diet    CAD  Chronic Afib  -Continue home meds metoprolol, xarelto    HLD  -Continue statin    DVT ppx: xarelto  GI ppx: No indication for GI prophylaxis    Code Status: Full Code    Dispo:  pt states he will update his wife on plan of care.  HCP: Wife Heather Tai

## 2023-03-17 NOTE — PROGRESS NOTE ADULT - SUBJECTIVE AND OBJECTIVE BOX
CC: f/u for LLE cellulitis    Patient reports: he is noting mild improvement in LLE pain and swelling    REVIEW OF SYSTEMS:  All other review of systems negative (Comprehensive ROS)    Antimicrobials Day #  :day 2  ceFAZolin   IVPB      ceFAZolin   IVPB 2000 milliGRAM(s) IV Intermittent every 8 hours    Other Medications Reviewed  MEDICATIONS  (STANDING):  aspirin enteric coated 81 milliGRAM(s) Oral daily  atorvastatin 10 milliGRAM(s) Oral at bedtime  ceFAZolin   IVPB      ceFAZolin   IVPB 2000 milliGRAM(s) IV Intermittent every 8 hours  metoprolol tartrate 50 milliGRAM(s) Oral daily  pantoprazole    Tablet 40 milliGRAM(s) Oral before breakfast  rivaroxaban 20 milliGRAM(s) Oral with dinner  saccharomyces boulardii 250 milliGRAM(s) Oral daily    T(F): 97.5 (03-16-23 @ 05:17), Max: 98.5 (03-15-23 @ 20:40)  HR: 56 (03-16-23 @ 05:30)  BP: 120/67 (03-16-23 @ 05:30)  RR: 18 (03-16-23 @ 05:17)  SpO2: 96% (03-16-23 @ 05:17)  Wt(kg): --    PHYSICAL EXAM:  General: alert, no acute distress  Eyes:  anicteric, no conjunctival injection, no discharge  Oropharynx: no lesions or injection 	  Neck: supple, without adenopathy  Lungs: clear to auscultation  Heart: irregular rate and rhythm; no murmur,   Abdomen: soft, nondistended, nontender, without mass or organomegaly  Skin: venous stasis changes  Extremities: no clubbing, cyanosis, + edema LLE  Neurologic: alert, oriented, moves all extremities  LLE with both venous stasis changes and pretibial erythema   LAB RESULTS:                        12.6   6.49  )-----------( 157      ( 16 Mar 2023 07:54 )             38.5     03-16    138  |  106  |  11  ----------------------------<  102<H>  3.9   |  24  |  0.73    Ca    8.2<L>      16 Mar 2023 07:54  Phos  3.4     03-16  Mg     1.8     03-16    TPro  6.2  /  Alb  2.6<L>  /  TBili  0.9  /  DBili  x   /  AST  17  /  ALT  16  /  AlkPhos  105  03-16    LIVER FUNCTIONS - ( 16 Mar 2023 07:54 )  Alb: 2.6 g/dL / Pro: 6.2 g/dL / ALK PHOS: 105 U/L / ALT: 16 U/L / AST: 17 U/L / GGT: x             MICROBIOLOGY:  RECENT CULTURES:      RADIOLOGY REVIEWED:    
CC: f/u for  left shin cellulitis  Patient reports he feels much better    REVIEW OF SYSTEMS:  All other review of systems negative (Comprehensive ROS)    Antimicrobials Day #  :3  ceFAZolin   IVPB      ceFAZolin   IVPB 2000 milliGRAM(s) IV Intermittent every 8 hours    Other Medications Reviewed    T(F): 97.6 (03-17-23 @ 05:37), Max: 98.1 (03-16-23 @ 21:55)  HR: 78 (03-17-23 @ 05:37)  BP: 149/98 (03-17-23 @ 05:37)  RR: 18 (03-17-23 @ 05:37)  SpO2: 100% (03-17-23 @ 05:37)  Wt(kg): --    PHYSICAL EXAM:  General: alert, no acute distress  Eyes:  anicteric, no conjunctival injection, no discharge  Oropharynx: no lesions or injection 	  Neck: supple, without adenopathy  Lungs: clear to auscultation  Heart: regular rate and rhythm; no murmur, rubs or gallops  Abdomen: soft, nondistended, nontender, without mass or organomegaly  Skin: no lesions  Extremities: no clubbing, cyanosis,. left shin with a carmina protrusion but faint red/purple pretibia, not very tender, good pulse, no groin tenderness.   Neurologic: alert, oriented, moves all extremities    LAB RESULTS:                        12.6   6.49  )-----------( 157      ( 16 Mar 2023 07:54 )             38.5     03-16    138  |  106  |  11  ----------------------------<  102<H>  3.9   |  24  |  0.73    Ca    8.2<L>      16 Mar 2023 07:54  Phos  3.4     03-16  Mg     1.8     03-16    TPro  6.2  /  Alb  2.6<L>  /  TBili  0.9  /  DBili  x   /  AST  17  /  ALT  16  /  AlkPhos  105  03-16    LIVER FUNCTIONS - ( 16 Mar 2023 07:54 )  Alb: 2.6 g/dL / Pro: 6.2 g/dL / ALK PHOS: 105 U/L / ALT: 16 U/L / AST: 17 U/L / GGT: x             MICROBIOLOGY:  RECENT CULTURES:      RADIOLOGY REVIEWED:            Assessment:  66 y/o man with obesity, gastric sleeve, venous stasis, afib , cva who developed red lle about 2 weeks ago , episode of chills, it remained red despite doxycycline. He was placed on cefazolin and doing much better. He has a vascular surgeon who he will follow up with and is on anticoagulation .   Plan:  can change to po cefadroxil 1000mg po bid for another 10 days  follow up with vascular surgeon  elevate leg when not walking 
Patient is a 67y old  Male who presents with a chief complaint of Cellulitis (15 Mar 2023 15:22)    BRIEF HOSPITAL COURSE:   68 y/o M with PMH obesity, CAD, chronic afib, HLD admitted w/ LLE cellulitis after failing outpatient PO antibiotics.     Events last 24 hours:   -ID consulted, started on Cefepime / Vanco course.  -Satting well on RA. Afebrile, hemodynamics stable.     PAST MEDICAL & SURGICAL HISTORY:  Stroke  visual disturbance  Atrial fibrillation, unspecified type  Obesity  Hypertension  H/O left knee surgery  History of right hip replacement  titanium hip  S/P gastric sleeve procedure    Review of Systems:  CONSTITUTIONAL: No fever, chills, or fatigue  EYES: No eye pain, visual disturbances, or discharge  ENMT:  No difficulty hearing, tinnitus, vertigo; No sinus or throat pain  NECK: No pain or stiffness  RESPIRATORY: No cough, wheezing, chills or hemoptysis; No shortness of breath  CARDIOVASCULAR: No chest pain, palpitations, dizziness, or leg swelling  GASTROINTESTINAL: No abdominal or epigastric pain. No nausea, vomiting, or hematemesis; No diarrhea or constipation. No melena or hematochezia.  GENITOURINARY: No dysuria, frequency, hematuria, or incontinence  NEUROLOGICAL: No headaches, memory loss, loss of strength, numbness, or tremors  SKIN: No itching, burning, rashes, or lesions   MUSCULOSKELETAL: No joint pain or swelling; No muscle, back, or extremity pain  PSYCHIATRIC: No depression, anxiety, mood swings, or difficulty sleeping    Medications:  cefepime   IVPB 1000 milliGRAM(s) IV Intermittent every 8 hours  vancomycin  IVPB 1250 milliGRAM(s) IV Intermittent every 12 hours  metoprolol tartrate 50 milliGRAM(s) Oral daily  melatonin 6 milliGRAM(s) Oral at bedtime PRN  aspirin enteric coated 81 milliGRAM(s) Oral daily  rivaroxaban 20 milliGRAM(s) Oral with dinner  pantoprazole    Tablet 40 milliGRAM(s) Oral before breakfast  polyethylene glycol 3350 17 Gram(s) Oral daily PRN  atorvastatin 10 milliGRAM(s) Oral at bedtime  sodium chloride 0.9%. 1000 milliLiter(s) IV Continuous <Continuous>  saccharomyces boulardii 250 milliGRAM(s) Oral daily    ICU Vital Signs Last 24 Hrs  T(C): 36.9 (15 Mar 2023 20:40), Max: 36.9 (15 Mar 2023 20:40)  T(F): 98.5 (15 Mar 2023 20:40), Max: 98.5 (15 Mar 2023 20:40)  HR: 71 (15 Mar 2023 20:40) (61 - 71)  BP: 143/83 (15 Mar 2023 20:40) (132/84 - 146/84)  BP(mean): --  ABP: --  ABP(mean): --  RR: 17 (15 Mar 2023 20:40) (17 - 18)  SpO2: 96% (15 Mar 2023 20:40) (96% - 98%)  O2 Parameters below as of 15 Mar 2023 20:40  Patient On (Oxygen Delivery Method): room air    I&O's Detail    LABS:                      13.9   8.43  )-----------( 181      ( 15 Mar 2023 13:00 )             42.1     03-15  137  |  105  |  15  ----------------------------<  127<H>  4.8   |  22  |  0.86  Ca    8.9      15 Mar 2023 13:00  TPro  7.1  /  Alb  2.9<L>  /  TBili  1.0  /  DBili  x   /  AST  32  /  ALT  23  /  AlkPhos  114  03-15    CAPILLARY BLOOD GLUCOSE    CULTURES:    Physical Examination:  General: Alert, oriented, interactive, nonfocal.  HEENT: PERRL.  NECK: Supple.   PULM: Clear to auscultation bilaterally.  CVS: s1/s2.  ABD: Soft, nondistended, nontender, normoactive bowel sounds.  EXT: No edema, nontender. LLE cellulitis.   SKIN: Warm.    RADIOLOGY:     68 y/o M with PMH obesity, CAD, chronic afib, HLD admitted w/ LLE cellulitis after failing outpatient PO antibiotics.     Plan:  Melatonin for adequate sleep/wake cycle.   Maintain goal MAP >65. Keep K~4 and Mg>2 for optimal arrhythmia suppression. ASA/Lipitor QD. Toprol QD.  Satting well on RA, will utilize supplemental O2 PRN to maintain goal SpO2 >88%. Incentive spirometry. Albuterol PRN.   DASH/TLC Diet. Protonix QD.  Renal function currently WNL. Trend lytes/Scr daily with BMP, Strict I's and O's, goal UOP 0.5 cc/kg/hr, renal dose meds and avoid nephrotoxins.  Aggressive glycemic control to limit FS glucose to <180mg/dl. BS stable.  Afebrile, no leukocytosis. ID following, recs appreciated. Started on Cefepime / Vanco courses, possibly narrow abx in AM. ABX use and/or discontinuation based on discussion with ID in conjunction with clinical features, culture data, and judicious procalcitonin monitoring.  Pharmacologic DVT Prophylaxis in addition to SCD's w/ Xarelto QD. Venous Duplex ordered for AM.   PT/OT evaluation.   AM labs ordered.   GOC: Full Code.     TIME SPENT: 35 minutes   Evaluating/treating patient, reviewing data/labs/imaging, discussing case with multidisciplinary team, discussing plan/goals of care with patient/family. Non-inclusive of procedure time. 
Patient is a 67y old  Male who presents with a chief complaint of Cellulitis (15 Mar 2023 21:02)      Patient seen and examined at bedside. No overnight events reported.   Pt feels better, thinks his left leg looks improved.    ALLERGIES:  No Known Allergies    MEDICATIONS  (STANDING):  aspirin enteric coated 81 milliGRAM(s) Oral daily  atorvastatin 10 milliGRAM(s) Oral at bedtime  ceFAZolin   IVPB      ceFAZolin   IVPB 2000 milliGRAM(s) IV Intermittent every 8 hours  metoprolol tartrate 50 milliGRAM(s) Oral daily  pantoprazole    Tablet 40 milliGRAM(s) Oral before breakfast  rivaroxaban 20 milliGRAM(s) Oral with dinner  saccharomyces boulardii 250 milliGRAM(s) Oral daily  sodium chloride 0.9%. 1000 milliLiter(s) (65 mL/Hr) IV Continuous <Continuous>    MEDICATIONS  (PRN):  melatonin 6 milliGRAM(s) Oral at bedtime PRN Insomnia  polyethylene glycol 3350 17 Gram(s) Oral daily PRN Constipation    Vital Signs Last 24 Hrs  T(F): 97.5 (16 Mar 2023 05:17), Max: 98.5 (15 Mar 2023 20:40)  HR: 56 (16 Mar 2023 05:30) (55 - 71)  BP: 120/67 (16 Mar 2023 05:30) (100/57 - 146/84)  RR: 18 (16 Mar 2023 05:17) (17 - 18)  SpO2: 96% (16 Mar 2023 05:17) (96% - 98%)  I&O's Summary    PHYSICAL EXAM:  General: NAD, A/O x 3  ENT: No gross hearing impairment, Moist mucous membranes, no thrush  Neck: Supple, No JVD  Lungs: Clear to auscultation bilaterally, good air entry, non-labored breathing  Cardio: RRR, S1/S2, No murmur  Abdomen: Soft, obese, Nontender, Nondistended; Bowel sounds present  Extremities: No calf tenderness, left lower ext with darkened discolored skin, warmth and erythema and edema  Psych: Appropriate mood and affect    LABS:                        12.6   6.49  )-----------( 157      ( 16 Mar 2023 07:54 )             38.5     03-16    138  |  106  |  11  ----------------------------<  102  3.9   |  24  |  0.73    Ca    8.2      16 Mar 2023 07:54  Phos  3.4     03-16  Mg     1.8     03-16    TPro  6.2  /  Alb  2.6  /  TBili  0.9  /  DBili  x   /  AST  17  /  ALT  16  /  AlkPhos  105  03-16            COVID-19 PCR: NotDetec (03-15-23 @ 13:00)    RADIOLOGY & ADDITIONAL TESTS:    Care Discussed with Consultants/Other Providers:   
Patient is a 67y old  Male who presents with a chief complaint of Cellulitis (16 Mar 2023 11:22)      Patient seen and examined at bedside. No overnight events reported.  Pt eager to go home he has no complaints.    ALLERGIES:  No Known Allergies    MEDICATIONS  (STANDING):  aspirin enteric coated 81 milliGRAM(s) Oral daily  atorvastatin 10 milliGRAM(s) Oral at bedtime  ceFAZolin   IVPB      ceFAZolin   IVPB 2000 milliGRAM(s) IV Intermittent every 8 hours  metoprolol tartrate 50 milliGRAM(s) Oral daily  pantoprazole    Tablet 40 milliGRAM(s) Oral before breakfast  rivaroxaban 20 milliGRAM(s) Oral with dinner  saccharomyces boulardii 250 milliGRAM(s) Oral daily    MEDICATIONS  (PRN):  melatonin 6 milliGRAM(s) Oral at bedtime PRN Insomnia  polyethylene glycol 3350 17 Gram(s) Oral daily PRN Constipation    Vital Signs Last 24 Hrs  T(F): 97.6 (17 Mar 2023 05:37), Max: 98.1 (16 Mar 2023 21:55)  HR: 78 (17 Mar 2023 05:37) (51 - 78)  BP: 149/98 (17 Mar 2023 05:37) (110/70 - 149/98)  RR: 18 (17 Mar 2023 05:37) (18 - 18)  SpO2: 100% (17 Mar 2023 05:37) (95% - 100%)  I&O's Summary    PHYSICAL EXAM:  General: NAD, A/O x 3  ENT: No gross hearing impairment, Moist mucous membranes, no thrush  Neck: Supple, No JVD  Lungs: Clear to auscultation bilaterally, good air entry, non-labored breathing  Cardio: RRR, S1/S2, No murmur  Abdomen: Soft, obese, Nontender, Nondistended; Bowel sounds present  Extremities: left lower ext with erythema, warmth, discoloration and edema  Psych: Appropriate mood and affect    LABS:                        12.6   6.49  )-----------( 157      ( 16 Mar 2023 07:54 )             38.5     03-16    138  |  106  |  11  ----------------------------<  102  3.9   |  24  |  0.73    Ca    8.2      16 Mar 2023 07:54  Phos  3.4     03-16  Mg     1.8     03-16    TPro  6.2  /  Alb  2.6  /  TBili  0.9  /  DBili  x   /  AST  17  /  ALT  16  /  AlkPhos  105  03-16      COVID-19 PCR: NotDetec (03-15-23 @ 13:00)    RADIOLOGY & ADDITIONAL TESTS:    Care Discussed with Consultants/Other Providers:

## 2023-03-17 NOTE — DISCHARGE NOTE PROVIDER - HOSPITAL COURSE
Hospital Course  66 y/o M with PMH obesity, CAD, chronic afib, HLD c/o LLE erythema, warmth and swellin, he failed outpatient PO antibiotics.  He was admitted with LLE cellulitis, and has a hx of MRSA.  ID consulted, s/p  vanc, cefepime in ED, started on Cefazolin 2 gmIV q8h, received 3 days.  Pt states he had Vascular studies outpt this week with TANO Forde/[facility] and studies were normal, he is refusing any further imaging and dopplers.  Pt remained afebrile, wbc normal range.  Pt also with Morbid Obesity, BMI 40.2-- -encourage wt loss and low fat diet.  Pt was changed to oral antibx for discharge.    Source of Infection:  cellulitis  Antibiotic / Last Day: 10 more days    Discharging Provider:  RL Grossman  Contact Info: Cell [phone] - Please call with any questions or concerns.    Outpatient Provider:  Dr Noland at [facility]

## 2023-03-17 NOTE — DISCHARGE NOTE PROVIDER - DISCHARGE DIET
See the results note from MA regarding Back pain. It has gotten worse.   Any advise.           Notes Recorded by Sushma Shaw MA on 5/21/2018 at 5:18 PM  Dr. Deleon, I called and notified patient of his lab results and your suggestion. Are you going to call in the lasix for him? He uses the [facility] in [place] for short term medications. He also wants you to know that he was having back problem all weekend and it has gotten worse. What should he takes for his back pain. Please advise and will call the patient back. Thanks!  ------     DASH Diet

## 2023-03-17 NOTE — DISCHARGE NOTE NURSING/CASE MANAGEMENT/SOCIAL WORK - NSDCPEFALRISK_GEN_ALL_CORE
For information on Fall & Injury Prevention, visit: [email or web address] OR  [email or web address] OR  https://www.cdc.gov/steadi/patient.html

## 2023-03-22 ENCOUNTER — EMERGENCY (EMERGENCY)
Facility: HOSPITAL | Age: 68
LOS: 1 days | Discharge: HOME | End: 2023-03-22
Attending: EMERGENCY MEDICINE | Admitting: EMERGENCY MEDICINE
Payer: COMMERCIAL

## 2023-03-22 VITALS
WEIGHT: 274.92 LBS | RESPIRATION RATE: 18 BRPM | OXYGEN SATURATION: 97 % | DIASTOLIC BLOOD PRESSURE: 84 MMHG | TEMPERATURE: 98 F | SYSTOLIC BLOOD PRESSURE: 128 MMHG | HEIGHT: 70 IN | HEART RATE: 58 BPM

## 2023-03-22 DIAGNOSIS — Z96.641 PRESENCE OF RIGHT ARTIFICIAL HIP JOINT: Chronic | ICD-10-CM

## 2023-03-22 DIAGNOSIS — Z98.890 OTHER SPECIFIED POSTPROCEDURAL STATES: Chronic | ICD-10-CM

## 2023-03-22 DIAGNOSIS — Z90.3 ACQUIRED ABSENCE OF STOMACH [PART OF]: Chronic | ICD-10-CM

## 2023-03-22 LAB
ALBUMIN SERPL ELPH-MCNC: 3.1 G/DL — LOW (ref 3.3–5)
ALP SERPL-CCNC: 116 U/L — SIGNIFICANT CHANGE UP (ref 40–120)
ALT FLD-CCNC: 19 U/L — SIGNIFICANT CHANGE UP (ref 10–45)
ANION GAP SERPL CALC-SCNC: 7 MMOL/L — SIGNIFICANT CHANGE UP (ref 5–17)
AST SERPL-CCNC: 17 U/L — SIGNIFICANT CHANGE UP (ref 10–40)
BASOPHILS # BLD AUTO: 0.05 K/UL — SIGNIFICANT CHANGE UP (ref 0–0.2)
BASOPHILS NFR BLD AUTO: 0.6 % — SIGNIFICANT CHANGE UP (ref 0–2)
BILIRUB SERPL-MCNC: 0.8 MG/DL — SIGNIFICANT CHANGE UP (ref 0.2–1.2)
BUN SERPL-MCNC: 16 MG/DL — SIGNIFICANT CHANGE UP (ref 7–23)
CALCIUM SERPL-MCNC: 8.7 MG/DL — SIGNIFICANT CHANGE UP (ref 8.4–10.5)
CHLORIDE SERPL-SCNC: 102 MMOL/L — SIGNIFICANT CHANGE UP (ref 96–108)
CO2 SERPL-SCNC: 30 MMOL/L — SIGNIFICANT CHANGE UP (ref 22–31)
CREAT SERPL-MCNC: 0.79 MG/DL — SIGNIFICANT CHANGE UP (ref 0.5–1.3)
EGFR: 97 ML/MIN/1.73M2 — SIGNIFICANT CHANGE UP
EOSINOPHIL # BLD AUTO: 0.17 K/UL — SIGNIFICANT CHANGE UP (ref 0–0.5)
EOSINOPHIL NFR BLD AUTO: 2.2 % — SIGNIFICANT CHANGE UP (ref 0–6)
GLUCOSE SERPL-MCNC: 103 MG/DL — HIGH (ref 70–99)
HCT VFR BLD CALC: 42.3 % — SIGNIFICANT CHANGE UP (ref 39–50)
HGB BLD-MCNC: 13.9 G/DL — SIGNIFICANT CHANGE UP (ref 13–17)
IMM GRANULOCYTES NFR BLD AUTO: 0.5 % — SIGNIFICANT CHANGE UP (ref 0–0.9)
LACTATE SERPL-SCNC: 1.2 MMOL/L — SIGNIFICANT CHANGE UP (ref 0.7–2)
LYMPHOCYTES # BLD AUTO: 1.56 K/UL — SIGNIFICANT CHANGE UP (ref 1–3.3)
LYMPHOCYTES # BLD AUTO: 20.2 % — SIGNIFICANT CHANGE UP (ref 13–44)
MCHC RBC-ENTMCNC: 29.9 PG — SIGNIFICANT CHANGE UP (ref 27–34)
MCHC RBC-ENTMCNC: 32.9 GM/DL — SIGNIFICANT CHANGE UP (ref 32–36)
MCV RBC AUTO: 91 FL — SIGNIFICANT CHANGE UP (ref 80–100)
MONOCYTES # BLD AUTO: 0.44 K/UL — SIGNIFICANT CHANGE UP (ref 0–0.9)
MONOCYTES NFR BLD AUTO: 5.7 % — SIGNIFICANT CHANGE UP (ref 2–14)
NEUTROPHILS # BLD AUTO: 5.47 K/UL — SIGNIFICANT CHANGE UP (ref 1.8–7.4)
NEUTROPHILS NFR BLD AUTO: 70.8 % — SIGNIFICANT CHANGE UP (ref 43–77)
NRBC # BLD: 0 /100 WBCS — SIGNIFICANT CHANGE UP (ref 0–0)
PLATELET # BLD AUTO: 196 K/UL — SIGNIFICANT CHANGE UP (ref 150–400)
POTASSIUM SERPL-MCNC: 4.4 MMOL/L — SIGNIFICANT CHANGE UP (ref 3.5–5.3)
POTASSIUM SERPL-SCNC: 4.4 MMOL/L — SIGNIFICANT CHANGE UP (ref 3.5–5.3)
PROT SERPL-MCNC: 7.2 G/DL — SIGNIFICANT CHANGE UP (ref 6–8.3)
RBC # BLD: 4.65 M/UL — SIGNIFICANT CHANGE UP (ref 4.2–5.8)
RBC # FLD: 13.6 % — SIGNIFICANT CHANGE UP (ref 10.3–14.5)
SARS-COV-2 RNA SPEC QL NAA+PROBE: SIGNIFICANT CHANGE UP
SODIUM SERPL-SCNC: 139 MMOL/L — SIGNIFICANT CHANGE UP (ref 135–145)
WBC # BLD: 7.73 K/UL — SIGNIFICANT CHANGE UP (ref 3.8–10.5)
WBC # FLD AUTO: 7.73 K/UL — SIGNIFICANT CHANGE UP (ref 3.8–10.5)

## 2023-03-22 PROCEDURE — 85025 COMPLETE CBC W/AUTO DIFF WBC: CPT

## 2023-03-22 PROCEDURE — 93005 ELECTROCARDIOGRAM TRACING: CPT

## 2023-03-22 PROCEDURE — 96365 THER/PROPH/DIAG IV INF INIT: CPT | Mod: XU

## 2023-03-22 PROCEDURE — 93010 ELECTROCARDIOGRAM REPORT: CPT

## 2023-03-22 PROCEDURE — 73701 CT LOWER EXTREMITY W/DYE: CPT | Mod: MA

## 2023-03-22 PROCEDURE — 99285 EMERGENCY DEPT VISIT HI MDM: CPT

## 2023-03-22 PROCEDURE — 36415 COLL VENOUS BLD VENIPUNCTURE: CPT

## 2023-03-22 PROCEDURE — 71045 X-RAY EXAM CHEST 1 VIEW: CPT

## 2023-03-22 PROCEDURE — 87040 BLOOD CULTURE FOR BACTERIA: CPT

## 2023-03-22 PROCEDURE — 87635 SARS-COV-2 COVID-19 AMP PRB: CPT

## 2023-03-22 PROCEDURE — 96367 TX/PROPH/DG ADDL SEQ IV INF: CPT

## 2023-03-22 PROCEDURE — 99285 EMERGENCY DEPT VISIT HI MDM: CPT | Mod: 25

## 2023-03-22 PROCEDURE — 83605 ASSAY OF LACTIC ACID: CPT

## 2023-03-22 PROCEDURE — 80053 COMPREHEN METABOLIC PANEL: CPT

## 2023-03-22 PROCEDURE — 73701 CT LOWER EXTREMITY W/DYE: CPT | Mod: 26,LT,MA

## 2023-03-22 PROCEDURE — 71045 X-RAY EXAM CHEST 1 VIEW: CPT | Mod: 26

## 2023-03-22 RX ORDER — TIRZEPATIDE 15 MG/.5ML
5 INJECTION, SOLUTION SUBCUTANEOUS
Qty: 0 | Refills: 0 | DISCHARGE

## 2023-03-22 RX ORDER — CEFAZOLIN SODIUM 1 G
2000 VIAL (EA) INJECTION ONCE
Refills: 0 | Status: COMPLETED | OUTPATIENT
Start: 2023-03-22 | End: 2023-03-22

## 2023-03-22 RX ORDER — RIVAROXABAN 15 MG-20MG
1 KIT ORAL
Qty: 0 | Refills: 0 | DISCHARGE

## 2023-03-22 RX ORDER — VANCOMYCIN HCL 1 G
1000 VIAL (EA) INTRAVENOUS ONCE
Refills: 0 | Status: COMPLETED | OUTPATIENT
Start: 2023-03-22 | End: 2023-03-22

## 2023-03-22 RX ORDER — ASPIRIN/CALCIUM CARB/MAGNESIUM 324 MG
1 TABLET ORAL
Qty: 0 | Refills: 0 | DISCHARGE

## 2023-03-22 RX ADMIN — Medication 250 MILLIGRAM(S): at 13:44

## 2023-03-22 RX ADMIN — Medication 1000 MILLIGRAM(S): at 14:41

## 2023-03-22 RX ADMIN — Medication 2000 MILLIGRAM(S): at 16:52

## 2023-03-22 RX ADMIN — Medication 100 MILLIGRAM(S): at 16:22

## 2023-03-22 NOTE — ED PROVIDER NOTE - PATIENT PORTAL LINK FT
You can access the FollowMyHealth Patient Portal offered by [facility] by registering at the following website: [email or web address]. By joining [employer]’s FollowMyHealth portal, you will also be able to view your health information using other applications (apps) compatible with our system.

## 2023-03-22 NOTE — ED PROVIDER NOTE - CLINICAL SUMMARY MEDICAL DECISION MAKING FREE TEXT BOX
67 year old male with medical history significant for chronic afib, HTN, HLD, CAD, TIA-on xarelto, sent in by Dr. Chavarria to rule out osteomyelitis/ anterior tibia abscess. Patient reports that 2 weeks ago left mid-distal anterior tibia started to become erythematous and edematous. He went to PCP 1 week ago dx doxy, endorses adherence to antibiotics but minimal clinical improvement. 3/15 admitted for IV antibiotics and discharge after 2 days and prescribed cefadroxil 1000mg qd. Pain is only present when patient has legs hanging off the bed or walking. Most amount of pain located in the proximal region of wound on the tibia and medial ankle. Patient notes small possible abscess in the proximal region of the wound. Notes that ankle has been more edematous than normal. Denies fever, chills, N/V, abdominal pain, weakness, dizziness, chest pain, calf tenderness, SOB.    discussed with Dr. Chavarria. will dc and pt to fu with Dr. Lees 3/27

## 2023-03-22 NOTE — ED ADULT NURSE NOTE - OBJECTIVE STATEMENT
Patient came from home by Dr. Hoskins Patient came from home by Dr. Chavarria for R/O osteomyelitis for LLE. Patient was previously admitted for antibiotic treatment to [facility]. Patient complaint of pain when walking on LLE. Denies any fever, chills, SOB, or chest pain. Patient taking oral antibiotics at home.

## 2023-03-22 NOTE — ED PROVIDER NOTE - OBJECTIVE STATEMENT
67 year old male with medical history significant for chronic afib, HTN, HLD, CAD, TIA-on xarelto, c/o of worsening left lower leg pain sent in by Dr. Chavarria to rule out osteomyelitis/ anterior tibia abscess. Patient notes that 2 weeks ago anterior tibia started to become erythematous and edematous. Initially he went to PCP 1 week ago dx doxy, endorses adherence to antibiotics but minimal clinical improvement. 3/15 admitted for IV antibiotics and discharge after 2 days. Pain is only present when patient has legs hanging or walking. Most amount of pain located int he proximal tibia and medial ankle. Patient notes possible abscess proximal tibial area. Notes that ankle has been more edematous than normal. Denies fever, chills, N/V, abdominal pain, weakness, dizziness, chest pain, calf tenderness, SOB. 67 year old male with medical history significant for chronic afib, HTN, HLD, CAD, TIA-on xarelto, sent in by Dr. Chavarria to rule out osteomyelitis/ anterior tibia abscess. Patient reports that 2 weeks ago left mid-distal anterior tibia started to become erythematous and edematous. He went to PCP 1 week ago dx doxy, endorses adherence to antibiotics but minimal clinical improvement. 3/15 admitted for IV antibiotics and discharge after 2 days and prescribed cefadroxil 1000mg qd. Pain is only present when patient has legs hanging off the bed or walking. Most amount of pain located in the proximal region of wound on the tibia and medial ankle. Patient notes small possible abscess in the proximal region of the wound. Notes that ankle has been more edematous than normal. Denies fever, chills, N/V, abdominal pain, weakness, dizziness, chest pain, calf tenderness, SOB.

## 2023-03-22 NOTE — ED PROVIDER NOTE - ATTENDING APP SHARED VISIT CONTRIBUTION OF CARE
Jose E with [facility] Ordonez. 67 year old male with medical history significant for chronic afib, HTN, HLD, CAD, TIA-on xarelto, sent in by Dr. Chavarria to rule out osteomyelitis/ anterior tibia abscess. Patient reports that 2 weeks ago left mid-distal anterior tibia started to become erythematous and edematous. He went to PCP 1 week ago dx doxy, endorses adherence to antibiotics but minimal clinical improvement. 3/15 admitted for IV antibiotics and discharge after 2 days and prescribed cefadroxil 1000mg qd. Pain is only present when patient has legs hanging off the bed or walking. Most amount of pain located in the proximal region of wound on the tibia and medial ankle. Patient notes small possible abscess in the proximal region of the wound. Notes that ankle has been more edematous than normal. Denies fever, chills, N/V, abdominal pain, weakness, dizziness, chest pain, calf tenderness, SOB.    discussed with Dr. Chavarria. will dc and pt to fu with Dr. Lees 3/27    I performed a face to face bedside interview with patient regarding history of present illness, review of symptoms and past medical history. I completed an independent physical exam.  I have discussed the patient's plan of care with Physician Assistant (PA). I agree with note as stated above, having amended the EMR as needed to reflect my findings.   This includes History of Present Illness, HIV, Past Medical/Surgical/Family/Social History, Allergies and Home Medications, Review of Systems, Physical Exam, and any Progress Notes during the time I functioned as the attending physician for this patient.

## 2023-03-22 NOTE — ED PROVIDER NOTE - NSFOLLOWUPINSTRUCTIONS_ED_ALL_ED_FT
Please follow-up with your doctor(s) within the next 3 days, but see medical sooner if your symptoms persist or worsen.  Please call tomorrow for an appointment.    You were given a copy of your labs and/or imaging.  Please go-over these with your doctor(s).     If you have any worsening of symptoms or any other concerns please see your doctor or return to the ED immediately.    Please continue taking your home medications as directed.  Do not use alcohol when taking any medication (especially antibiotics, tylenol or other pain medication) unless you check with the doctor or pharmacist.       Cellulitis    Cellulitis is a skin infection caused by bacteria. This condition occurs most often in the arms and lower legs but can occur anywhere over the body. Symptoms include redness, swelling, warm skin, tenderness, and chills/fever. If you were prescribed an antibiotic medicine, take it as told by your health care provider. Do not stop taking the antibiotic even if you start to feel better.    SEEK IMMEDIATE MEDICAL CARE IF YOU HAVE ANY OF THE FOLLOWING SYMPTOMS: worsening fever, red streaks coming from affected area, vomiting or diarrhea, or dizziness/lightheadedness.

## 2023-03-22 NOTE — ED PROVIDER NOTE - RESPIRATORY, MLM
Breath sounds clear and equal bilaterally. H Plasty Text: Given the location of the defect, shape of the defect and the proximity to free margins a H-plasty was deemed most appropriate for repair.  Using a sterile surgical marker, the appropriate advancement arms of the H-plasty were drawn incorporating the defect and placing the expected incisions within the relaxed skin tension lines where possible. The area thus outlined was incised deep to adipose tissue with a #15 scalpel blade. The skin margins were undermined to an appropriate distance in all directions utilizing iris scissors.  The opposing advancement arms were then advanced into place in opposite direction and anchored with interrupted buried subcutaneous sutures.

## 2023-03-22 NOTE — ED PROVIDER NOTE - PHYSICAL EXAMINATION
Skin: red/ dark purple cellulitic on the left anterior tibia:  Proximal site of the wound has a small 1cm x 1 cm fluctuant abscess. Tracking of wound to the medial left ankle with sinus passages seen. TTP to anterior tibia and medial region of ankle. Edematous ankle present. Skin: No breaking of the skin, lacerations, abrasions. . red/ dark purple cellulitic wound on the left mid-distal anterior tibia 20 cm x 10 cm tracking 10 cm towards the medial ankle.  Proximal site of the wound has a small 1cm x 1 cm fluctuant abscess.  TTP to anterior tibia and medial region of ankle. Edematous ankle present.   DP/PT present bilaterally. Sensation intact. ROM of ankle, knee intact. Negative Homans sign.

## 2023-04-26 NOTE — ED PROVIDER NOTE - CROS ED ROS STATEMENT
all other ROS negative except as per HPI PAST SURGICAL HISTORY:  History of laparoscopic cholecystectomy 1980

## 2023-05-11 NOTE — DISCHARGE NOTE PROVIDER - CARE PROVIDER_API CALL
Laz Chavarria)  Plastic Surgery; Surgery  [address], [address]  [place] [address]  Phone: [phone]  Fax: [phone]  Follow Up Time: 1-3 days   Repair Performed By Another Provider Text (Leave Blank If You Do Not Want): After the tissue was excised the defect was repaired by another provider.

## 2023-08-22 NOTE — H&P ADULT - ALLERGIC/IMMUNOLOGIC
negative

## 2023-09-04 NOTE — PATIENT PROFILE ADULT - CENTRAL VENOUS CATHETER/PICC LINE
You were seen in the Emergency Department for leg pain and shortness of breath. Your blood work was ok. Your CT scan and ultrasound did not show a blood clot in your legs or your lungs. Please follow up with a cardiologist and primary care doctor.      1) Continue all previously prescribed medications as directed.    2) Follow up with your primary care physician - take copies of your results.    3) Return to the Emergency Department for worsening or persistent symptoms, and/or ANY NEW OR CONCERNING SYMPTOMS.
PAST SURGICAL HISTORY:  No significant past surgical history     
no

## 2024-06-12 NOTE — PROGRESS NOTE ADULT - ASSESSMENT
Medicare Annual Wellness Visit    Ilia Mauricio is here for Medicare AWV    Assessment & Plan   Medicare annual wellness visit, subsequent  Ventral hernia without obstruction or gangrene  Primary osteoarthritis of left shoulder  -     External Referral To Orthopedic Surgery  Hyperglycemia  -     Hemoglobin A1C; Future  Mixed hyperlipidemia  -     Lipid Panel; Future  Essential hypertension  -     Comprehensive Metabolic Panel; Future  Rising PSA level  -     PSA, Prostatic Specific Antigen ([place]); Future  Class 1 obesity due to excess calories with serious comorbidity and body mass index (BMI) of 30.0 to 30.9 in adult  -     External Referral to Dietitian    Recommendations for Preventive Services Due: see orders and patient instructions/AVS.  Recommended screening schedule for the next 5-10 years is provided to the patient in written form: see Patient Instructions/AVS.     Return in about 6 months (around 12/12/2024) for Hyperlipidemia 30 m in.     Subjective       Patient's complete Health Risk Assessment and screening values have been reviewed and are found in Flowsheets. The following problems were reviewed today and where indicated follow up appointments were made and/or referrals ordered.    Positive Risk Factor Screenings with Interventions:            Controlled Medication Review:      Today's Pain Level: No data recorded   Opioid Risk: (Low risk score <55) Opioid risk score: 41    Patient is low risk for opioid use disorder or overdose.    Last PDMP Aman as Reviewed:  Review User Review Instant Review Result   KIMMY BARRAGAN 6/3/2024  3:29 PM     Reviewed PDMP [1]          Activity, Diet, and Weight:  On average, how many days per week do you engage in moderate to strenuous exercise (like a brisk walk)?: 2 days  On average, how many minutes do you engage in exercise at this level?: 30 min    Do you eat balanced/healthy meals regularly?: Yes    Body mass index is 30.28 kg/m². (!) Abnormal    Obesity  POD #1 abdominal lipectomy    -d/c home today per dr nichols  -see discharge plan

## 2024-06-19 NOTE — ED ADULT NURSE REASSESSMENT NOTE - NS ED NURSE REASSESS COMMENT FT1
-S/w pt; identity verified with name & .  -Pt referred to OV 24 w/ AR; siting he could not recall what he was told.  I reminded him AR discussed 3-options, & per his request she repeated them a 2nd time. Pt asked to pursue biopsy in IR.   -Pt reports he was unable to schedule in I. R. \"B/c the order wasn't written correctly.\"  -I called IR & s/w Christie Chavarria (schedulers) & they state the Drs clinic template does not have appts for Consults to discuss biopsies.  Their schedule accomodates biopsy procedures.  -It is noted Dr. Leon Nicholas/ PCP wrote order for \"IR Biopsy.\"  -The IR schedulers report the biopsy order has been tasked to the Radiologists for review.  -Encounter complete.   1245 Pt admitted and antibiotics infusing Robinson

## 2024-10-18 ENCOUNTER — APPOINTMENT (OUTPATIENT)
Dept: UROLOGY | Facility: CLINIC | Age: 69
End: 2024-10-18

## 2024-10-18 VITALS
BODY MASS INDEX: 37.22 KG/M2 | OXYGEN SATURATION: 94 % | HEART RATE: 57 BPM | WEIGHT: 260 LBS | HEIGHT: 70 IN | SYSTOLIC BLOOD PRESSURE: 141 MMHG | TEMPERATURE: 97.3 F | DIASTOLIC BLOOD PRESSURE: 75 MMHG

## 2024-10-18 DIAGNOSIS — R97.20 ELEVATED PROSTATE, SPECIFIC ANTIGEN [PSA]: ICD-10-CM

## 2024-10-18 DIAGNOSIS — N40.0 BENIGN PROSTATIC HYPERPLASIA WITHOUT LOWER URINARY TRACT SYMPMS: ICD-10-CM

## 2024-10-18 PROCEDURE — 99204 OFFICE O/P NEW MOD 45 MIN: CPT

## 2024-10-18 RX ORDER — ASPIRIN 81 MG
81 TABLET,CHEWABLE ORAL
Refills: 0 | Status: ACTIVE | COMMUNITY

## 2024-10-18 RX ORDER — ATORVASTATIN CALCIUM 10 MG/1
10 TABLET, FILM COATED ORAL
Refills: 0 | Status: ACTIVE | COMMUNITY

## 2024-10-18 RX ORDER — RIVAROXABAN 10 MG/1
10 TABLET, FILM COATED ORAL
Refills: 0 | Status: ACTIVE | COMMUNITY

## 2024-10-21 LAB
APPEARANCE: CLEAR
BACTERIA: NEGATIVE /HPF
BILIRUBIN URINE: NEGATIVE
BLOOD URINE: NEGATIVE
CAST: 0 /LPF
COLOR: YELLOW
EPITHELIAL CELLS: 1 /HPF
GLUCOSE QUALITATIVE U: NEGATIVE MG/DL
HCT VFR BLD CALC: 48.7 %
HGB BLD-MCNC: 15.8 G/DL
KETONES URINE: NEGATIVE MG/DL
LEUKOCYTE ESTERASE URINE: ABNORMAL
MCHC RBC-ENTMCNC: 30.9 PG
MCHC RBC-ENTMCNC: 32.4 GM/DL
MCV RBC AUTO: 95.1 FL
MICROSCOPIC-UA: NORMAL
NITRITE URINE: NEGATIVE
PH URINE: 6.5
PLATELET # BLD AUTO: 171 K/UL
PROTEIN URINE: NEGATIVE MG/DL
RBC # BLD: 5.12 M/UL
RBC # FLD: 14.1 %
RED BLOOD CELLS URINE: 1 /HPF
SPECIFIC GRAVITY URINE: 1.01
UROBILINOGEN URINE: 1 MG/DL
WBC # FLD AUTO: 6.34 K/UL
WHITE BLOOD CELLS URINE: 0 /HPF

## 2024-10-22 DIAGNOSIS — N39.0 URINARY TRACT INFECTION, SITE NOT SPECIFIED: ICD-10-CM

## 2024-10-22 LAB
ANION GAP SERPL CALC-SCNC: 12 MMOL/L
BACTERIA UR CULT: ABNORMAL
BUN SERPL-MCNC: 13 MG/DL
CALCIUM SERPL-MCNC: 10 MG/DL
CHLORIDE SERPL-SCNC: 105 MMOL/L
CO2 SERPL-SCNC: 25 MMOL/L
CREAT SERPL-MCNC: 0.87 MG/DL
EGFR: 93 ML/MIN/1.73M2
GLUCOSE SERPL-MCNC: 92 MG/DL
POTASSIUM SERPL-SCNC: 5.7 MMOL/L
SODIUM SERPL-SCNC: 141 MMOL/L

## 2024-10-22 RX ORDER — AMOXICILLIN AND CLAVULANATE POTASSIUM 875; 125 MG/1; MG/1
875-125 TABLET, COATED ORAL
Qty: 14 | Refills: 0 | Status: ACTIVE | COMMUNITY
Start: 2024-10-22 | End: 1900-01-01

## 2024-11-08 RX ORDER — LEVOFLOXACIN 750 MG/1
750 TABLET, FILM COATED ORAL DAILY
Qty: 7 | Refills: 0 | Status: ACTIVE | COMMUNITY
Start: 2024-11-08 | End: 1900-01-01

## 2024-11-11 ENCOUNTER — NON-APPOINTMENT (OUTPATIENT)
Age: 69
End: 2024-11-11

## 2024-11-12 ENCOUNTER — TRANSCRIPTION ENCOUNTER (OUTPATIENT)
Age: 69
End: 2024-11-12

## 2024-11-12 ENCOUNTER — NON-APPOINTMENT (OUTPATIENT)
Age: 69
End: 2024-11-12

## 2024-11-13 ENCOUNTER — OUTPATIENT (OUTPATIENT)
Dept: OUTPATIENT SERVICES | Facility: HOSPITAL | Age: 69
LOS: 1 days | End: 2024-11-13
Payer: COMMERCIAL

## 2024-11-13 DIAGNOSIS — Z90.3 ACQUIRED ABSENCE OF STOMACH [PART OF]: Chronic | ICD-10-CM

## 2024-11-13 DIAGNOSIS — R97.20 ELEVATED PROSTATE SPECIFIC ANTIGEN [PSA]: ICD-10-CM

## 2024-11-13 DIAGNOSIS — Z96.641 PRESENCE OF RIGHT ARTIFICIAL HIP JOINT: Chronic | ICD-10-CM

## 2024-11-13 DIAGNOSIS — Z98.890 OTHER SPECIFIED POSTPROCEDURAL STATES: Chronic | ICD-10-CM

## 2024-11-13 PROCEDURE — C8001: CPT

## 2024-11-14 ENCOUNTER — APPOINTMENT (OUTPATIENT)
Dept: UROLOGY | Facility: AMBULATORY SURGERY CENTER | Age: 69
End: 2024-11-14

## 2024-12-06 ENCOUNTER — NON-APPOINTMENT (OUTPATIENT)
Age: 69
End: 2024-12-06

## 2024-12-11 NOTE — ASU PATIENT PROFILE, ADULT - NSICDXPASTMEDICALHX_GEN_ALL_CORE_FT
PAST MEDICAL HISTORY:  Atrial fibrillation, unspecified type     Hypertension     Obesity     Stroke visual disturbance     PAST MEDICAL HISTORY:  Atrial fibrillation, unspecified type     Obesity     Stroke visual disturbance

## 2024-12-11 NOTE — ASU PATIENT PROFILE, ADULT - FALL HARM RISK - FALLEN IN PAST
Accidental fall fell down 3 steps when getting up to use restroom at night. Injury to left hand/wrist that will require surgery per pt./Accidental fall

## 2024-12-11 NOTE — ASU PATIENT PROFILE, ADULT - FALL HARM RISK - HARM RISK INTERVENTIONS

## 2024-12-12 ENCOUNTER — OUTPATIENT (OUTPATIENT)
Dept: OUTPATIENT SERVICES | Facility: HOSPITAL | Age: 69
LOS: 1 days | Discharge: HOME | End: 2024-12-12
Payer: COMMERCIAL

## 2024-12-12 ENCOUNTER — APPOINTMENT (OUTPATIENT)
Dept: UROLOGY | Facility: AMBULATORY SURGERY CENTER | Age: 69
End: 2024-12-12

## 2024-12-12 ENCOUNTER — TRANSCRIPTION ENCOUNTER (OUTPATIENT)
Age: 69
End: 2024-12-12

## 2024-12-12 ENCOUNTER — RESULT REVIEW (OUTPATIENT)
Age: 69
End: 2024-12-12

## 2024-12-12 VITALS
OXYGEN SATURATION: 97 % | SYSTOLIC BLOOD PRESSURE: 150 MMHG | WEIGHT: 271.83 LBS | HEIGHT: 70 IN | TEMPERATURE: 97 F | RESPIRATION RATE: 18 BRPM | HEART RATE: 62 BPM | DIASTOLIC BLOOD PRESSURE: 69 MMHG

## 2024-12-12 VITALS
HEART RATE: 64 BPM | DIASTOLIC BLOOD PRESSURE: 86 MMHG | OXYGEN SATURATION: 94 % | RESPIRATION RATE: 13 BRPM | TEMPERATURE: 97 F | SYSTOLIC BLOOD PRESSURE: 138 MMHG

## 2024-12-12 DIAGNOSIS — Z90.3 ACQUIRED ABSENCE OF STOMACH [PART OF]: Chronic | ICD-10-CM

## 2024-12-12 DIAGNOSIS — Z98.890 OTHER SPECIFIED POSTPROCEDURAL STATES: Chronic | ICD-10-CM

## 2024-12-12 DIAGNOSIS — Z96.641 PRESENCE OF RIGHT ARTIFICIAL HIP JOINT: Chronic | ICD-10-CM

## 2024-12-12 PROBLEM — I10 ESSENTIAL (PRIMARY) HYPERTENSION: Chronic | Status: INACTIVE | Noted: 2022-12-22 | Resolved: 2024-12-12

## 2024-12-12 PROCEDURE — G0416: CPT | Mod: 26

## 2024-12-12 PROCEDURE — 88344 IMHCHEM/IMCYTCHM EA MLT ANTB: CPT | Mod: 26

## 2024-12-12 PROCEDURE — 76999F: CUSTOM | Mod: 26

## 2024-12-12 PROCEDURE — 55700: CPT

## 2024-12-12 RX ORDER — OXYCODONE HYDROCHLORIDE 30 MG/1
5 TABLET ORAL ONCE
Refills: 0 | Status: DISCONTINUED | OUTPATIENT
Start: 2024-12-12 | End: 2024-12-12

## 2024-12-12 RX ORDER — ONDANSETRON HYDROCHLORIDE 4 MG/1
4 TABLET, FILM COATED ORAL ONCE
Refills: 0 | Status: DISCONTINUED | OUTPATIENT
Start: 2024-12-12 | End: 2024-12-12

## 2024-12-12 RX ORDER — FENTANYL 12 UG/H
25 PATCH, EXTENDED RELEASE TRANSDERMAL
Refills: 0 | Status: DISCONTINUED | OUTPATIENT
Start: 2024-12-12 | End: 2024-12-12

## 2024-12-12 RX ORDER — HEPARIN SODIUM,PORCINE 1000/ML
5000 VIAL (ML) INJECTION ONCE
Refills: 0 | Status: DISCONTINUED | OUTPATIENT
Start: 2024-12-12 | End: 2024-12-12

## 2024-12-12 RX ORDER — 0.9 % SODIUM CHLORIDE 0.9 %
500 INTRAVENOUS SOLUTION INTRAVENOUS
Refills: 0 | Status: DISCONTINUED | OUTPATIENT
Start: 2024-12-12 | End: 2024-12-12

## 2024-12-12 RX ADMIN — Medication 100 MILLILITER(S): at 12:43

## 2024-12-12 NOTE — ASU DISCHARGE PLAN (ADULT/PEDIATRIC) - FINANCIAL ASSISTANCE
[facility] provides services at a reduced cost to those who are determined to be eligible through [facility]’s financial assistance program. Information regarding [facility]’s financial assistance program can be found by going to [email or web address] or by calling [phone].

## 2024-12-12 NOTE — ASU DISCHARGE PLAN (ADULT/PEDIATRIC) - CARE PROVIDER_API CALL
Camila Spaulding  Urology  [address], [address] Floor [facility] [place] [address]  Phone: [phone]  Fax: [phone]  Follow Up Time:

## 2024-12-12 NOTE — BRIEF OPERATIVE NOTE - FIRST ASSIST PARTICIPATION DETAILS - (COMPONENTS OF THE PROCEDURE THAT ASSISTANT PARTICIPATED IN)
First Degree I acted within this role throughout the entirety of the procedure performed by the primary surgeon

## 2024-12-13 ENCOUNTER — NON-APPOINTMENT (OUTPATIENT)
Age: 69
End: 2024-12-13

## 2024-12-20 ENCOUNTER — NON-APPOINTMENT (OUTPATIENT)
Age: 69
End: 2024-12-20

## 2024-12-20 DIAGNOSIS — C61 MALIGNANT NEOPLASM OF PROSTATE: ICD-10-CM

## 2025-01-06 NOTE — DISCHARGE NOTE PROVIDER - NSDCCPCAREPLAN_GEN_ALL_CORE_FT
[facility]   Physical Therapy Treatment Note     Name: Brannon Severino  Clinic Number: [id number]    Therapy Diagnosis:   Encounter Diagnoses   Name Primary?    Decreased ROM of neck Yes    Weakness of both shoulders      Physician: Dorinda Mckenzie MD    Visit Date: 1/6/2025    Physician Orders: PT Eval and Treat   Medical Diagnosis from Referral: M25.511 (ICD-10-CM) - Right shoulder pain   Evaluation Date: 12/19/2024  Authorization Period Expiration: 12/31/2024  Plan of Care Expiration: 01/30/2025  Progress Note Due: 01/18/2025  Date of Surgery: N/A  Visit # / Visits authorized: 3/ 20   FOTO: 1/ 3     Precautions: Standard, Diabetes, and hx of stroke     PTA Visit #: 0/5     FOTO first follow up:   FOTO second follow up:     Time In: 1100  Time Out: 1140  Total Billable Time: 40 minutes    SUBJECTIVE     Pt reports: upper back discomfort when he wakes up.   He was compliant with home exercise program.  Response to previous treatment: pain is no longer constant  Functional change: as above    Pain: 3/10  Location: R shoulder      OBJECTIVE     Objective Measures updated at progress report unless specified.     Treatment     Brannon received the treatments listed below:      therapeutic exercises to develop strength, endurance, ROM, and flexibility for 40 minutes including:    UBE 3'3'  Thoracic extension, x30  Open books x15 B   Wall slides with towel 3 x 10   Seated row machine 30#, 3 x 10  No money red theraband, 3 x 10  Seated d2 extension (shoulder B) with red theraband 3 x 10 B    Patient Education and Home Exercises     Home Exercises Provided and Patient Education Provided     Education provided:   - HEP education  - POC education    Written Home Exercises Provided: Patient instructed to cont prior HEP. Exercises were reviewed and Brannon was able to demonstrate them prior to the end of the session.  Brannon demonstrated fair  understanding of the education provided.  See EMR under Patient Instructions for exercises provided during therapy sessions    ASSESSMENT     Brannon tolerated therapy fair, poor carry over and attentiveness this session.  He requires verbal cueing and tactile to complete all interventions. Challenged with form and strength. Progress range of motion and strength as tolerated.     Brannon Is progressing well towards his goals.   Pt prognosis is Fair.     Pt will continue to benefit from skilled outpatient physical therapy to address the deficits listed in the problem list box on initial evaluation, provide pt/family education and to maximize pt's level of independence in the home and community environment.     Pt's spiritual, cultural and educational needs considered and pt agreeable to plan of care and goals.     Anticipated barriers to physical therapy: transportation    Goals:  Short Term Goals: 3 weeks. Pt agrees with goals set.  Pt will demonstrate independence and compliance with initial HEP to improve independence and symptom management.   Pt will report shoulder pain </= 2/10 with cervical spine range of motion to demonstrate improved condition and ability to complete ADLs, self care and leisurely task.    Pt will improve MMT of periscapular muscles to >/= 3/5 to improve tolerance for Adls, self care and leisurely task and decrease the demand on his cervical spine. .    Pt will improve cervical spine ROM by >= 25 % to improve tolerance for ADLs, self care and leisurely task.       Long Term Goals: 6 weeks. Pt agrees with goals set.   Pt will demonstrate independence and compliance with final HEP to continue managing symptoms and developing mobility, motor control and strength.    Pt will report shoulder pain </= 0/10 with cervical spine range of motion to demonstrate improved condition and ability to complete his ADLs, self care and leisurely task.    Pt will improve MMT of scapular upward rotators to >/= 3+/5 to improve tolerance for static  posture and decrease the demand to the thoracic spine.    Pt goal: To figure out what is going on and treat it.      PLAN     Plan of care Certification: 12/19/2024 to 01/30/2025.     Outpatient Physical Therapy 2 times weekly for 6 weeks to include the following interventions: Cervical/Lumbar Traction, Electrical Stimulation NMES, Manual Therapy, Moist Heat/ Ice, Neuromuscular Re-ed, Patient Education, Self Care, Therapeutic Activities, and Therapeutic Exercise.    Lucila Yu, PTA        PRINCIPAL DISCHARGE DIAGNOSIS  Diagnosis: Abdominal pannus  Assessment and Plan of Treatment:

## 2025-01-09 ENCOUNTER — NON-APPOINTMENT (OUTPATIENT)
Age: 70
End: 2025-01-09

## 2025-01-10 ENCOUNTER — NON-APPOINTMENT (OUTPATIENT)
Age: 70
End: 2025-01-10

## 2025-01-29 ENCOUNTER — APPOINTMENT (OUTPATIENT)
Dept: UROLOGY | Facility: CLINIC | Age: 70
End: 2025-01-29

## 2025-01-29 ENCOUNTER — NON-APPOINTMENT (OUTPATIENT)
Age: 70
End: 2025-01-29

## 2025-03-17 NOTE — ED ADULT NURSE NOTE - CHPI ED SYMPTOMS NEG
-- DO NOT REPLY / DO NOT REPLY ALL --  -- This inbox is not monitored. If this was sent to the wrong provider or department, reroute message to [facility] [employer] pool. --  -- Message is from Engagement Center Operations ([facility]) --        Referral Request  Name of Specialist:   Provider's specialty: Ophthalmology    Medical condition for referral:  Follow Up Appointment on 3/24 &     Is this a NEW request?:       Referral ordered by: Patient      Insurance type:       Payor:  [employer]VANTAGE / Plan: [employer]  SIMPLE (HMO-POS) PLAN -[phone] / Product Type: [employer] MEDICARE ADVANTAGE    Preferred Delivery Method  Call when ready for pickup - phone number to notify: [phone]    Caller Information       Contact Date/Time Type Contact Phone/Fax    2025 11:27 AM CDT Phone (Incoming) [facility] [phone]            Alternative phone number: n/a    Can a detailed message be left?  Yes - Voicemail     Patient has been advised the message will be addressed within 2-3 business days        no dizziness/no vomiting/no weakness/no fever/no chills/no tingling

## (undated) DEVICE — DRSG COMBINE 5X9"

## (undated) DEVICE — LAP PAD 18 X 18"

## (undated) DEVICE — SPECIMEN CONTAINER PET

## (undated) DEVICE — NDL BIOPSY CHIBA 22G X 20CM

## (undated) DEVICE — GRID BRACHYTHERAPY EZ 18G

## (undated) DEVICE — SUT PDS II 1 48" TP-1

## (undated) DEVICE — LIGASURE SMALL JAW

## (undated) DEVICE — PREP CHLORAPREP HI-LITE ORANGE 26ML

## (undated) DEVICE — TUBE INFILTRATING

## (undated) DEVICE — DRSG DERMABOND PRINEO 60CM

## (undated) DEVICE — SYR TOOMEY 50ML

## (undated) DEVICE — DRAPE MEDIUM SHEET 44" X 70"

## (undated) DEVICE — GLV 8 PROTEXIS (WHITE)

## (undated) DEVICE — PLASTIC SOLUTION BOWL 160Z

## (undated) DEVICE — MARKING PEN W RULER

## (undated) DEVICE — TUBING HI-VAC PSI-TEC STERILE

## (undated) DEVICE — CANISTER SUCTION LID GUARD 3000CC

## (undated) DEVICE — SYR LUER LOK 10CC

## (undated) DEVICE — SOL IRR POUR NS 0.9% 500ML

## (undated) DEVICE — PACK MINOR

## (undated) DEVICE — SOL IRR POUR H2O 1500ML

## (undated) DEVICE — ABDOMINAL BINDER XL 9" X 62"-74"

## (undated) DEVICE — VENODYNE/SCD SLEEVE CALF LARGE

## (undated) DEVICE — SUT MONOCRYL 2-0 27" SH UNDYED

## (undated) DEVICE — DRSG DERMABOND 0.7ML

## (undated) DEVICE — DRSG TELFA 3 X 8

## (undated) DEVICE — DRAPE TOWEL BLUE 17" X 24"

## (undated) DEVICE — DRAIN RESERVOIR FOR JACKSON PRATT 100CC CARDINAL

## (undated) DEVICE — FOLEY HOLDER STATLOCK 2 WAY ADULT

## (undated) DEVICE — FOLEY TRAY 16FR SURESTEP LTX BG

## (undated) DEVICE — POSITIONER STRAP ARMBOARD VELCRO TS-30

## (undated) DEVICE — DRAPE CHEST BREAST 106" X 122"

## (undated) DEVICE — NDL HYPO REGULAR BEVEL 25G X 1.5" (BLUE)

## (undated) DEVICE — PREP DURAPREP 26CC

## (undated) DEVICE — DRSG XEROFORM 5 X 9"

## (undated) DEVICE — BALLOON ENDOCAVITY 2X14CM

## (undated) DEVICE — SUT MONOCRYL 4-0 27" PS-2 UNDYED

## (undated) DEVICE — SUT POLYSORB 2-0 30" V-20 UNDYED

## (undated) DEVICE — SUT MONOCRYL 3-0 27" PS-2 UNDYED

## (undated) DEVICE — DRSG STERISTRIPS 0.5 X 4"

## (undated) DEVICE — SYR LUER LOK 50CC

## (undated) DEVICE — DRSG KERLIX ROLL 4.5"

## (undated) DEVICE — WARMING BLANKET UPPER ADULT

## (undated) DEVICE — NDL MAX CORE 18G X 25CM

## (undated) DEVICE — DRAIN JACKSON PRATT 10MM FLAT FULL NO TROCAR